# Patient Record
Sex: FEMALE | Race: WHITE | NOT HISPANIC OR LATINO | Employment: UNEMPLOYED | URBAN - METROPOLITAN AREA
[De-identification: names, ages, dates, MRNs, and addresses within clinical notes are randomized per-mention and may not be internally consistent; named-entity substitution may affect disease eponyms.]

---

## 2019-05-18 ENCOUNTER — HOSPITAL ENCOUNTER (EMERGENCY)
Facility: HOSPITAL | Age: 34
Discharge: HOME | End: 2019-05-18
Attending: EMERGENCY MEDICINE | Admitting: EMERGENCY MEDICINE
Payer: COMMERCIAL

## 2019-05-18 VITALS
SYSTOLIC BLOOD PRESSURE: 98 MMHG | WEIGHT: 119 LBS | OXYGEN SATURATION: 98 % | HEART RATE: 78 BPM | TEMPERATURE: 98 F | DIASTOLIC BLOOD PRESSURE: 56 MMHG | RESPIRATION RATE: 16 BRPM

## 2019-05-18 DIAGNOSIS — F41.9 ANXIETY: Primary | ICD-10-CM

## 2019-05-18 PROCEDURE — 99283 EMERGENCY DEPT VISIT LOW MDM: CPT

## 2019-05-18 PROCEDURE — 99283 EMERGENCY DEPT VISIT LOW MDM: CPT | Performed by: EMERGENCY MEDICINE

## 2019-05-18 RX ORDER — LORAZEPAM 0.5 MG/1
0.5 TABLET ORAL ONCE
Status: COMPLETED | OUTPATIENT
Start: 2019-05-18 | End: 2019-05-18

## 2019-05-18 RX ADMIN — LORAZEPAM 0.5 MG: 0.5 TABLET ORAL at 02:04

## 2024-08-19 ENCOUNTER — HOSPITAL ENCOUNTER (INPATIENT)
Facility: HOSPITAL | Age: 39
LOS: 2 days | Discharge: HOME | DRG: 897 | End: 2024-08-21
Attending: EMERGENCY MEDICINE | Admitting: EMERGENCY MEDICINE
Payer: COMMERCIAL

## 2024-08-19 DIAGNOSIS — F41.9 ANXIETY AND DEPRESSION: ICD-10-CM

## 2024-08-19 DIAGNOSIS — F10.10 ALCOHOL ABUSE: Primary | ICD-10-CM

## 2024-08-19 DIAGNOSIS — F13.10 BENZODIAZEPINE ABUSE (HCC): ICD-10-CM

## 2024-08-19 DIAGNOSIS — F32.A ANXIETY AND DEPRESSION: ICD-10-CM

## 2024-08-19 DIAGNOSIS — F10.20 ALCOHOL USE DISORDER, MODERATE, DEPENDENCE (HCC): ICD-10-CM

## 2024-08-19 PROBLEM — F13.20 BENZODIAZEPINE DEPENDENCE (HCC): Status: ACTIVE | Noted: 2024-08-19

## 2024-08-19 PROBLEM — D50.9 IRON DEFICIENCY ANEMIA: Status: ACTIVE | Noted: 2024-08-19

## 2024-08-19 PROBLEM — F10.939 ALCOHOL WITHDRAWAL (HCC): Status: ACTIVE | Noted: 2024-08-19

## 2024-08-19 PROBLEM — F10.90 ALCOHOL USE DISORDER: Status: ACTIVE | Noted: 2024-08-19

## 2024-08-19 LAB
ALBUMIN SERPL BCG-MCNC: 4.3 G/DL (ref 3.5–5)
ALP SERPL-CCNC: 53 U/L (ref 34–104)
ALT SERPL W P-5'-P-CCNC: 19 U/L (ref 7–52)
AMPHETAMINES SERPL QL SCN: NEGATIVE
ANION GAP SERPL CALCULATED.3IONS-SCNC: 10 MMOL/L (ref 4–13)
AST SERPL W P-5'-P-CCNC: 41 U/L (ref 13–39)
BARBITURATES UR QL: NEGATIVE
BASOPHILS # BLD AUTO: 0.06 THOUSANDS/ÂΜL (ref 0–0.1)
BASOPHILS NFR BLD AUTO: 1 % (ref 0–1)
BENZODIAZ UR QL: NEGATIVE
BILIRUB SERPL-MCNC: 0.59 MG/DL (ref 0.2–1)
BUN SERPL-MCNC: 10 MG/DL (ref 5–25)
CALCIUM SERPL-MCNC: 8.7 MG/DL (ref 8.4–10.2)
CHLORIDE SERPL-SCNC: 104 MMOL/L (ref 96–108)
CO2 SERPL-SCNC: 22 MMOL/L (ref 21–32)
COCAINE UR QL: NEGATIVE
CREAT SERPL-MCNC: 0.7 MG/DL (ref 0.6–1.3)
EOSINOPHIL # BLD AUTO: 0.14 THOUSAND/ÂΜL (ref 0–0.61)
EOSINOPHIL NFR BLD AUTO: 3 % (ref 0–6)
ERYTHROCYTE [DISTWIDTH] IN BLOOD BY AUTOMATED COUNT: 13.8 % (ref 11.6–15.1)
ETHANOL SERPL-MCNC: 161 MG/DL
EXT PREGNANCY TEST URINE: NEGATIVE
EXT. CONTROL: NORMAL
FENTANYL UR QL SCN: NEGATIVE
GFR SERPL CREATININE-BSD FRML MDRD: 108 ML/MIN/1.73SQ M
GLUCOSE SERPL-MCNC: 119 MG/DL (ref 65–140)
HCT VFR BLD AUTO: 34.2 % (ref 34.8–46.1)
HGB BLD-MCNC: 11.7 G/DL (ref 11.5–15.4)
HYDROCODONE UR QL SCN: NEGATIVE
IMM GRANULOCYTES # BLD AUTO: 0.01 THOUSAND/UL (ref 0–0.2)
IMM GRANULOCYTES NFR BLD AUTO: 0 % (ref 0–2)
LIPASE SERPL-CCNC: 37 U/L (ref 11–82)
LYMPHOCYTES # BLD AUTO: 1.57 THOUSANDS/ÂΜL (ref 0.6–4.47)
LYMPHOCYTES NFR BLD AUTO: 37 % (ref 14–44)
MAGNESIUM SERPL-MCNC: 1.9 MG/DL (ref 1.9–2.7)
MCH RBC QN AUTO: 32.8 PG (ref 26.8–34.3)
MCHC RBC AUTO-ENTMCNC: 34.2 G/DL (ref 31.4–37.4)
MCV RBC AUTO: 96 FL (ref 82–98)
METHADONE UR QL: NEGATIVE
MONOCYTES # BLD AUTO: 0.47 THOUSAND/ÂΜL (ref 0.17–1.22)
MONOCYTES NFR BLD AUTO: 11 % (ref 4–12)
NEUTROPHILS # BLD AUTO: 1.96 THOUSANDS/ÂΜL (ref 1.85–7.62)
NEUTS SEG NFR BLD AUTO: 48 % (ref 43–75)
NRBC BLD AUTO-RTO: 0 /100 WBCS
OPIATES UR QL SCN: NEGATIVE
OXYCODONE+OXYMORPHONE UR QL SCN: NEGATIVE
PCP UR QL: NEGATIVE
PLATELET # BLD AUTO: 211 THOUSANDS/UL (ref 149–390)
PMV BLD AUTO: 8 FL (ref 8.9–12.7)
POTASSIUM SERPL-SCNC: 3.7 MMOL/L (ref 3.5–5.3)
PROT SERPL-MCNC: 7.2 G/DL (ref 6.4–8.4)
RBC # BLD AUTO: 3.57 MILLION/UL (ref 3.81–5.12)
SODIUM SERPL-SCNC: 136 MMOL/L (ref 135–147)
THC UR QL: NEGATIVE
WBC # BLD AUTO: 4.21 THOUSAND/UL (ref 4.31–10.16)

## 2024-08-19 PROCEDURE — 99285 EMERGENCY DEPT VISIT HI MDM: CPT | Performed by: EMERGENCY MEDICINE

## 2024-08-19 PROCEDURE — 85025 COMPLETE CBC W/AUTO DIFF WBC: CPT | Performed by: EMERGENCY MEDICINE

## 2024-08-19 PROCEDURE — 82077 ASSAY SPEC XCP UR&BREATH IA: CPT | Performed by: EMERGENCY MEDICINE

## 2024-08-19 PROCEDURE — 83690 ASSAY OF LIPASE: CPT | Performed by: EMERGENCY MEDICINE

## 2024-08-19 PROCEDURE — 93005 ELECTROCARDIOGRAM TRACING: CPT

## 2024-08-19 PROCEDURE — 80053 COMPREHEN METABOLIC PANEL: CPT | Performed by: EMERGENCY MEDICINE

## 2024-08-19 PROCEDURE — 99284 EMERGENCY DEPT VISIT MOD MDM: CPT

## 2024-08-19 PROCEDURE — 80307 DRUG TEST PRSMV CHEM ANLYZR: CPT | Performed by: EMERGENCY MEDICINE

## 2024-08-19 PROCEDURE — 83735 ASSAY OF MAGNESIUM: CPT | Performed by: EMERGENCY MEDICINE

## 2024-08-19 PROCEDURE — 81025 URINE PREGNANCY TEST: CPT | Performed by: EMERGENCY MEDICINE

## 2024-08-19 PROCEDURE — 96360 HYDRATION IV INFUSION INIT: CPT

## 2024-08-19 PROCEDURE — 36415 COLL VENOUS BLD VENIPUNCTURE: CPT | Performed by: EMERGENCY MEDICINE

## 2024-08-19 RX ORDER — GABAPENTIN 300 MG/1
300 CAPSULE ORAL ONCE
Status: COMPLETED | OUTPATIENT
Start: 2024-08-19 | End: 2024-08-19

## 2024-08-19 RX ORDER — LANOLIN ALCOHOL/MO/W.PET/CERES
100 CREAM (GRAM) TOPICAL ONCE
Status: COMPLETED | OUTPATIENT
Start: 2024-08-19 | End: 2024-08-19

## 2024-08-19 RX ORDER — PRAZOSIN HYDROCHLORIDE 1 MG/1
1 CAPSULE ORAL
Status: ON HOLD | COMMUNITY
End: 2024-08-21

## 2024-08-19 RX ORDER — ESCITALOPRAM OXALATE 10 MG/1
20 TABLET ORAL DAILY
Status: DISCONTINUED | OUTPATIENT
Start: 2024-08-20 | End: 2024-08-21 | Stop reason: HOSPADM

## 2024-08-19 RX ORDER — ACETAMINOPHEN 325 MG/1
650 TABLET ORAL EVERY 6 HOURS PRN
Status: DISCONTINUED | OUTPATIENT
Start: 2024-08-19 | End: 2024-08-21 | Stop reason: HOSPADM

## 2024-08-19 RX ORDER — GABAPENTIN 300 MG/1
300 CAPSULE ORAL
Status: DISCONTINUED | OUTPATIENT
Start: 2024-08-20 | End: 2024-08-20

## 2024-08-19 RX ORDER — ESCITALOPRAM OXALATE 20 MG/1
20 TABLET ORAL DAILY
Status: ON HOLD | COMMUNITY
End: 2024-08-21

## 2024-08-19 RX ORDER — ENOXAPARIN SODIUM 100 MG/ML
40 INJECTION SUBCUTANEOUS DAILY
Status: DISCONTINUED | OUTPATIENT
Start: 2024-08-20 | End: 2024-08-21 | Stop reason: HOSPADM

## 2024-08-19 RX ORDER — PHENOBARBITAL SODIUM 130 MG/ML
260 INJECTION, SOLUTION INTRAMUSCULAR; INTRAVENOUS ONCE
Status: COMPLETED | OUTPATIENT
Start: 2024-08-19 | End: 2024-08-19

## 2024-08-19 RX ORDER — SODIUM CHLORIDE 9 MG/ML
100 INJECTION, SOLUTION INTRAVENOUS CONTINUOUS
Status: DISCONTINUED | OUTPATIENT
Start: 2024-08-19 | End: 2024-08-20

## 2024-08-19 RX ORDER — DIAZEPAM 10 MG/2ML
10 INJECTION, SOLUTION INTRAMUSCULAR; INTRAVENOUS ONCE
Status: COMPLETED | OUTPATIENT
Start: 2024-08-19 | End: 2024-08-19

## 2024-08-19 RX ORDER — GABAPENTIN 300 MG/1
300 CAPSULE ORAL
Status: ON HOLD | COMMUNITY
End: 2024-08-21

## 2024-08-19 RX ORDER — CLONAZEPAM 1 MG/1
2 TABLET ORAL 2 TIMES DAILY PRN
COMMUNITY
End: 2024-08-21

## 2024-08-19 RX ORDER — PHENOBARBITAL SODIUM 130 MG/ML
130 INJECTION, SOLUTION INTRAMUSCULAR; INTRAVENOUS ONCE
Status: COMPLETED | OUTPATIENT
Start: 2024-08-19 | End: 2024-08-19

## 2024-08-19 RX ORDER — ONDANSETRON 2 MG/ML
4 INJECTION INTRAMUSCULAR; INTRAVENOUS EVERY 6 HOURS PRN
Status: DISCONTINUED | OUTPATIENT
Start: 2024-08-19 | End: 2024-08-21 | Stop reason: HOSPADM

## 2024-08-19 RX ADMIN — SODIUM CHLORIDE 100 ML/HR: 0.9 INJECTION, SOLUTION INTRAVENOUS at 20:20

## 2024-08-19 RX ADMIN — DIAZEPAM 10 MG: 5 INJECTION INTRAMUSCULAR; INTRAVENOUS at 20:07

## 2024-08-19 RX ADMIN — THIAMINE HCL TAB 100 MG 100 MG: 100 TAB at 17:30

## 2024-08-19 RX ADMIN — PHENOBARBITAL SODIUM 260 MG: 130 INJECTION INTRAMUSCULAR; INTRAVENOUS at 20:08

## 2024-08-19 RX ADMIN — PHENOBARBITAL SODIUM 130 MG: 130 INJECTION INTRAMUSCULAR; INTRAVENOUS at 23:51

## 2024-08-19 RX ADMIN — SODIUM CHLORIDE 1000 ML: 0.9 INJECTION, SOLUTION INTRAVENOUS at 17:09

## 2024-08-19 RX ADMIN — GABAPENTIN 300 MG: 300 CAPSULE ORAL at 23:52

## 2024-08-19 NOTE — ED PROVIDER NOTES
History  Chief Complaint   Patient presents with    Detox Evaluation     Requesting detox from Alcohol and benozs - reports half bottle of vodka /day (last drink 20 min pta) - denies hx of seizures - Klonopin 1mg twice a day - reports nausea and stomach pain at this time      Patient with history of alcohol abuse and benzo abuse just had detox in the facility [place] with a Valium taper however she left the facility because of panic attack she has been drinking a half a bottle of alcohol a day (375ml)  Patient also uses Klonopin 1 mg twice a day sometimes occasionally x-ray she last drank before she got here she denies any withdrawal seizures no chest pain or shortness of breath no vomiting diarrhea no abdominal pain she does have a medical marijuana card and uses as well history of PTSD patient is requesting detox      Detox Evaluation  Associated symptoms: no abdominal pain, no confusion, no hallucinations, no palpitations, no seizures, no shortness of breath, no suicidal ideation and no vomiting        None       Past Medical History:   Diagnosis Date    Anxiety     Migraine        Past Surgical History:   Procedure Laterality Date     SECTION         History reviewed. No pertinent family history.  I have reviewed and agree with the history as documented.    E-Cigarette/Vaping     E-Cigarette/Vaping Substances     Social History     Tobacco Use    Smoking status: Never    Smokeless tobacco: Never   Substance Use Topics    Alcohol use: Yes     Comment: 1/2 bottle vodka/day    Drug use: Yes     Types: Marijuana, Benzodiazepines       Review of Systems   Constitutional:  Negative for chills and fever.   Eyes:  Negative for visual disturbance.   Respiratory:  Negative for shortness of breath.    Cardiovascular:  Negative for chest pain and palpitations.   Gastrointestinal:  Negative for abdominal pain, diarrhea and vomiting.   Genitourinary:  Negative for dysuria.   Musculoskeletal:  Negative for  arthralgias and back pain.   Skin:  Negative for color change and rash.   Neurological:  Negative for seizures and syncope.   Psychiatric/Behavioral:  Negative for confusion, hallucinations and suicidal ideas.    All other systems reviewed and are negative.      Physical Exam  Physical Exam  Vitals and nursing note reviewed.   Constitutional:       General: She is not in acute distress.     Appearance: She is well-developed.   HENT:      Head: Normocephalic and atraumatic.   Eyes:      Conjunctiva/sclera: Conjunctivae normal.   Cardiovascular:      Rate and Rhythm: Normal rate and regular rhythm.      Heart sounds: No murmur heard.  Pulmonary:      Effort: Pulmonary effort is normal. No respiratory distress.      Breath sounds: Normal breath sounds.   Abdominal:      Palpations: Abdomen is soft.      Tenderness: There is no abdominal tenderness.   Musculoskeletal:         General: No swelling.      Cervical back: Neck supple.   Skin:     General: Skin is warm and dry.      Capillary Refill: Capillary refill takes less than 2 seconds.      Coloration: Skin is not jaundiced.   Neurological:      General: No focal deficit present.      Mental Status: She is alert.   Psychiatric:         Mood and Affect: Mood normal.         Thought Content: Thought content normal.         Vital Signs  ED Triage Vitals   Temperature Pulse Respirations Blood Pressure SpO2   08/19/24 1620 08/19/24 1618 08/19/24 1618 08/19/24 1618 08/19/24 1618   97.7 °F (36.5 °C) 104 18 146/99 99 %      Temp Source Heart Rate Source Patient Position - Orthostatic VS BP Location FiO2 (%)   08/19/24 1620 08/19/24 1618 08/19/24 1618 08/19/24 1618 --   Oral Monitor Sitting Left arm       Pain Score       --                  Vitals:    08/19/24 1618   BP: 146/99   Pulse: 104   Patient Position - Orthostatic VS: Sitting         Visual Acuity      ED Medications  Medications   sodium chloride 0.9 % bolus 1,000 mL (1,000 mL Intravenous New Bag 8/19/24 1702)    thiamine tablet 100 mg (100 mg Oral Given 8/19/24 1730)       Diagnostic Studies  Results Reviewed       Procedure Component Value Units Date/Time    POCT pregnancy, urine [[id number]]  (Normal) Resulted: 08/19/24 1741    Lab Status: Final result Updated: 08/19/24 1741     Control Valid     EXT Preg Test, Ur Negative    Rapid drug screen, urine [[id number]] Collected: 08/19/24 1730    Lab Status: In process Specimen: Urine, Clean Catch Updated: 08/19/24 1739    Comprehensive metabolic panel [[id number]]  (Abnormal) Collected: 08/19/24 1704    Lab Status: Final result Specimen: Blood from Arm, Right Updated: 08/19/24 1727     Sodium 136 mmol/L      Potassium 3.7 mmol/L      Chloride 104 mmol/L      CO2 22 mmol/L      ANION GAP 10 mmol/L      BUN 10 mg/dL      Creatinine 0.70 mg/dL      Glucose 119 mg/dL      Calcium 8.7 mg/dL      AST 41 U/L      ALT 19 U/L      Alkaline Phosphatase 53 U/L      Total Protein 7.2 g/dL      Albumin 4.3 g/dL      Total Bilirubin 0.59 mg/dL      eGFR 108 ml/min/1.73sq m     Narrative:      [employer] guidelines for Chronic Kidney Disease (CKD):     Stage 1 with normal or high GFR (GFR > 90 mL/min/1.73 square meters)    Stage 2 Mild CKD (GFR = 60-89 mL/min/1.73 square meters)    Stage 3A Moderate CKD (GFR = 45-59 mL/min/1.73 square meters)    Stage 3B Moderate CKD (GFR = 30-44 mL/min/1.73 square meters)    Stage 4 Severe CKD (GFR = 15-29 mL/min/1.73 square meters)    Stage 5 End Stage CKD (GFR <15 mL/min/1.73 square meters)  Note: GFR calculation is accurate only with a steady state creatinine    Lipase [[id number]]  (Normal) Collected: 08/19/24 1704    Lab Status: Final result Specimen: Blood from Arm, Right Updated: 08/19/24 1727     Lipase 37 u/L     Magnesium [[id number]]  (Normal) Collected: 08/19/24 1704    Lab Status: Final result Specimen: Blood from Arm, Right Updated: 08/19/24 1727     Magnesium 1.9 mg/dL     Ethanol [[id number]]  (Abnormal) Collected:  08/19/24 1704    Lab Status: Final result Specimen: Blood from Arm, Right Updated: 08/19/24 1727     Ethanol Lvl 161 mg/dL     CBC and differential [[id number]]  (Abnormal) Collected: 08/19/24 1704    Lab Status: Final result Specimen: Blood from Arm, Right Updated: 08/19/24 1712     WBC 4.21 Thousand/uL      RBC 3.57 Million/uL      Hemoglobin 11.7 g/dL      Hematocrit 34.2 %      MCV 96 fL      MCH 32.8 pg      MCHC 34.2 g/dL      RDW 13.8 %      MPV 8.0 fL      Platelets 211 Thousands/uL      nRBC 0 /100 WBCs      Segmented % 48 %      Immature Grans % 0 %      Lymphocytes % 37 %      Monocytes % 11 %      Eosinophils Relative 3 %      Basophils Relative 1 %      Absolute Neutrophils 1.96 Thousands/µL      Absolute Immature Grans 0.01 Thousand/uL      Absolute Lymphocytes 1.57 Thousands/µL      Absolute Monocytes 0.47 Thousand/µL      Eosinophils Absolute 0.14 Thousand/µL      Basophils Absolute 0.06 Thousands/µL                    No orders to display              Procedures  ECG 12 Lead Documentation Only    Date/Time: 8/19/2024 4:44 PM    Performed by: Narendra Montez MD  Authorized by: Narendra Montez MD    Indications / Diagnosis:  Alcohol abuse  ECG reviewed by me, the ED Provider: yes    Interpretation:     Interpretation: normal    Rate:     ECG rate:  101  Rhythm:     Rhythm: sinus tachycardia    QRS:     QRS intervals:  Normal  ST segments:     ST segments:  Non-specific  Comments:      Qt nml           ED Course                                               Medical Decision Making  Patient with history of alcohol abuse and benzo abuse been drinking half 1/5 of alcohol liquor for a month straight requesting detox patient workup is thus far essentially unremarkable EKG showed normal sinus rhythm normal QRS and QT lab work showed no gap acidosis no bump in her LFTs alcohol level is 161 here no signs of withdrawal  Patient was excepted by the detox service    Amount and/or Complexity of Data Reviewed  Labs:  ordered.    Risk  OTC drugs.  Decision regarding hospitalization.                 Disposition  Final diagnoses:   Alcohol abuse   Benzodiazepine abuse (HCC)     Time reflects when diagnosis was documented in both MDM as applicable and the Disposition within this note       Time User Action Codes Description Comment    8/19/2024  5:46 PM Narendra Montez Add [F10.10] Alcohol abuse     8/19/2024  5:46 PM Narendra Montez [F13.10] Benzodiazepine abuse (HCC)           ED Disposition       ED Disposition   Admit    Condition   Stable    Date/Time   Mon Aug 19, 2024  5:46 PM    Comment   Case was discussed with detox and the patient's admission status was agreed to be Admission Status: inpatient status to the service of Dr. Disla .               Follow-up Information    None         Patient's Medications    No medications on file       No discharge procedures on file.    PDMP Review       None            ED Provider  Electronically Signed by             Narendra Montez MD  08/19/24 3709

## 2024-08-19 NOTE — H&P
HISTORY & PHYSICAL EXAM  DEPARTMENT OF MEDICAL TOXICOLOGY  LEVEL 4 MEDICAL DETOX UNIT  Mckenna Brown 39 y.o. female MRN: [id number]  Unit/Bed#: [address] [place] [address] [id number] Encounter: [id number]      Reason for Admission/Principal Problem: Ethanol withdrawal, Ethanol use disorder  Admitting Provider: Emmie Hobson PA-C  Attending Provider: Noemy att. providers found   8/19/2024  4:21 PM        Iron deficiency anemia  Assessment & Plan  History of CHENG   Obtain iron panel  Continue to monitor and initiate ferrous sulfate if needed     Anxiety and depression  Assessment & Plan  Patient endorses a h/o chronic anxiety and depression  Home medications include: Lexapro 20 mg, Klonpin 1mg BID   Denies SI/HI/thoughts of self harm  Continue home medications  Recommend OP f/u with PCP/OP Psychiatry     Benzodiazepine dependence (HCC)  Assessment & Plan  Patient with a h/o chronic benzodiazepine use  Using Klonopin  2mg daily   Denies H/o benzo withdrawal seizures  Withdrawal management under SEWS protocol as above as patient is no longer interested in continuing   Recommend f/u with OP Psychiatry for continued psychiatric care  Consult case management/CRS for assistance with aftercare resources - pt interested in inpatient drug and alcohol after discharge  Encourage cessation of benzodiazepine use     Alcohol use disorder  Assessment & Plan  Pt with a h/o chronic heavy alcohol use   Drinks 1/5th of liquor daily  Previous admission to the [facility] Medical Detox Unit   Denies H/o withdrawal seizures  Contemplating naltrexone at this time  Withdrawal management as above  Initiate IVFs, IV thiamine, folic acid, and MVI  Consult case management/CRS for assistance with aftercare resources - pt interested in inpatient drug and alcohol rehab at this time     * Alcohol withdrawal (HCC)  Assessment & Plan  H/o chronic daily alcohol consumption, denies h/o withdrawal seizures  Last drink: 8/19/24   Ethanol lvl: 161 8/19/24 146/99   Follow  SEWS protocol with symptom-triggered phenobarbital for medically-assisted alcohol withdrawal  Current symptoms include anxiety, tachycardia, diaphoresis   SEWS score upon admission 6  Dose phenobarbital as indicated   Will initiate thiamine, folic acid, and multivitamin supplementation   Continue to monitor vitals, symptoms                 VTE Prophylaxis: Enoxaparin (Lovenox)  / sequential compression device   Code Status: Full Code       Anticipated Length of Stay:  Patient will be admitted on an Inpatient basis with an anticipated length of stay of  2  midnights.   Justification for Hospital Stay: alcohol withdrawal     For any questions or concerns, please Tiger Text the advanced practitioner in the role of [facility]-DETOX-AP On Call      This patient qualifies for Level IV medically managed intensive inpatient services under the criteria set by the American Society of Addiction Medicine, including dimensions 1-3. The patient is in withdrawal (or is intoxicated with high risk of withdrawal), with severe and unstable medical and/or psychiatric (dual diagnosis) problems, requiring requires 24-hour medical and nursing care and the full resources of a licensed hospital.          SEWS PHENOBARBITAL PROTOCOL FOR ALCOHOL WITHDRAWAL    Admit patient to medical detox unit and continue supportive care and stabilization of acute ethanol withdrawal per medical toxicology/detox treatment pathway. Monitor ethanol withdrawal severity via the Severity of Ethanol Withdrawal Scale (SEWS) Q4 hours and then hourly if/when SEWS > 6. Treat withdrawal per pathway and reassess Q30-60 minutes.           Mild SEWS Score 1-6  Administer medications* (IV or PO; PO preferred):  If initial SEWS score: diazepam 10mg PO/IV x 1 AND phenobarbital 65 mg PO/IV x 1  If repeat SEWS score 1-6: phenobarbital 65 mg PO/IV q1 hour x 5 doses maximum   Reassessment:   SEWS q1 hour after each dose until SEWS 0 x 2 hours  VS q1 hours (until SEWS 0, then q4  hours)  Notify provider for bedside evaluation if 5-dose maximum is reached, RASS of -3 to -5, or SEWS score escalates to moderate or severe.   Moderate SEWS Score 7-12  Administer medications* (IV):  If initial SEWS score: diazepam 10mg IV x 1 AND phenobarbital 260 mg IV x 1  If repeat SEWS score 7-12 or score escalated from mild: phenobarbital 130 mg IV q30 minutes x 5 doses maximum   Reassessment:  SEWS q30 minutes after each dose until SEWS < 7 (then hourly until SEWS 0 x 2 hours)  VS q30 minutes until SEWS < 7 (then hourly until SEWS 0, then q4 hours)  Notify provider for bedside evaluation if 5-dose maximum is reached, RASS of -3 to -5, or SEWS score escalates to severe.   Severe SEWS Score ? 13  Administer medications* (IV):  If initial SEWS score: Diazepam 10 mg IV x 1 AND phenobarbital 650 mg IV piggyback x 1 over 15-30 minutes  If repeat SEWS score ? 13 or score escalated from mild or moderate: phenobarbital 130 mg IV q30 minutes x 5 doses maximum   Reassessment:  SEWS q30 minutes after each dose until SEWS < 7 (then hourly until SEWS 0 x 2 hours)   VS q30 minutes until SEWS < 7 (then hourly until SEWS 0, then q4 hours)  Notify provider for bedside evaluation if 5-dose maximum is reached or RASS of -3 to -5   *Hold medications and notify provider if CNS depression, respirations < 10/min, or RASS of -3 to -5.         Medications to be administered adjunctively if more than 2 grams of phenobarbital is needed for stabilization of withdrawal; require attending approval.   Dexmedetomidine infusion 0.1-1mcg/kg/hr IV infusion, titratable to reduced agitation (Goal: RASS -2)  Ketamine   Acute agitated delirium: 1-2 mg/kg IV or 4-5 mg/kg IM  Refractory withdrawal: 0.1-1mg/kg/hr IV infusion, titratable to reduced agitation (Goal: RASS -2)    Further evaluation, screening and treatment:  Evaluate complete metabolic panel, transaminases, INR, and lipase. Assess hepatic ultrasound for any sign of alcoholic liver  disease or cirrhosis, and ultimately refer for further hepatic evaluation and care as/if indicated.    Additional medications for ethanol associated malnutrition:  Thiamine 100 mg IV daily, increase to 500 mg TID for signs/symptoms of Wernicke's Encephalopathy or Wernicke Korsakoff Syndrome   Folic acid 1 mg IV daily   Multivitamin PO daily      Will offer first monthly injection of Naltrexone 380 mg IM, once patient is stabilized, as it has been shown to assist in decreasing cravings for ethanol.     Evaluate and treat for coexisting substance use, such as opioids and nicotine. Discuss risk factors for infectious disease, such as history of intravenous drug abuse, and offer hepatitis and HIV screening if indicated.     Case management consultation to assist with coordination of subsequent treatment after discharge.          Hx and PE limited by: none     HPI: Mckenna Brown is a 39 y.o. year old female who presents with alcohol withdrawal seeking detoxification. Patient initially presented to the [facility] ED requesting alcohol and benzodiazepine detoxification. Patient reports a month ago she was put on a valium taper for her withdrawal symptoms and was referred to inpatient drug and alcohol rehab. She reports that she had a panic attack and subsequently has relapsed and has been drinking daily.  Per PDMP patient takes prescribed klonopin with her last script filled on 7/30/24. At this time patient wishes to undergo both benzodiazepine and alcohol detoxification. Serum alcohol was 161 in the ED. . Patient denies history of withdrawal seizures. At this time patient is interested in inpatient drug and alcohol reahb    Preferred alcoholic beverage(s): ?   Quantity and frequency of alcohol intake: 1/5 th daily   Use of any ethanol substitutes (toxic alcohols): no  Date/Time of last alcohol intake: 8/19/24   Current signs and symptoms of ethanol withdrawal: anxiety and diaphoresis    SEWS Total Score: 8 (8/19/2024 11:33  PM)        Ethanol Withdrawal History  Previous ethanol withdrawal? yes  Prior inpatient treatment for ethanol withdrawal? yes  Prior outpatient treatment for ethanol withdrawal? no  History of seizures with prior ethanol withdrawal? no  Prior treatment with naltrexone (Vivitrol)? no  Current treatment with naltrexone (Vivitrol)? no  Other current treatment for ethanol use disorder? no  Co-existing substance use? yes    Review of PDMP: yes     Social History     Substance and Sexual Activity   Alcohol Use Yes    Comment: 1/2 bottle vodka/day     Social History     Substance and Sexual Activity   Drug Use Yes    Types: Marijuana, Benzodiazepines     Social History     Tobacco Use   Smoking Status Never   Smokeless Tobacco Never       Review of Systems   Constitutional:  Negative for chills and fever.   HENT:  Negative for ear pain and sore throat.    Eyes:  Negative for pain and visual disturbance.   Respiratory:  Negative for cough and shortness of breath.    Cardiovascular:  Negative for chest pain and palpitations.   Gastrointestinal:  Negative for abdominal pain and vomiting.   Genitourinary:  Negative for dysuria and hematuria.   Musculoskeletal:  Negative for arthralgias and back pain.   Skin:  Negative for color change and rash.   Neurological:  Positive for tremors. Negative for seizures and syncope.   Psychiatric/Behavioral:  The patient is nervous/anxious.    All other systems reviewed and are negative.      Historical Information   Past Medical History:   Diagnosis Date    Anxiety     Migraine      Past Surgical History:   Procedure Laterality Date     SECTION       History reviewed. No pertinent family history.  Social History   Marital Status: Single   Occupation:   Patient Pre-hospital Living Situation: Alone  Patient Pre-hospital Level of Mobility: Independent mobility  Patient Pre-hospital Diet Restrictions: None    No Known Allergies    Prior to Admission medications    Not  on File       Current Facility-Administered Medications   Medication Dose Route Frequency    acetaminophen (TYLENOL) tablet 650 mg  650 mg Oral Q6H PRN    acetaminophen (TYLENOL) tablet 650 mg  650 mg Oral Q6H PRN    [START ON 8/20/2024] enoxaparin (LOVENOX) subcutaneous injection 40 mg  40 mg Subcutaneous Daily    [START ON 8/20/2024] escitalopram (LEXAPRO) tablet 20 mg  20 mg Oral Daily    [START ON 8/20/2024] folic acid 1 mg, thiamine (VITAMIN B1) 100 mg in sodium chloride 0.9 % 100 mL IV piggyback   Intravenous Daily    [START ON 8/20/2024] gabapentin (NEURONTIN) capsule 300 mg  300 mg Oral HS    gabapentin (NEURONTIN) capsule 300 mg  300 mg Oral Once    [START ON 8/20/2024] multivitamin-minerals (CENTRUM) tablet 1 tablet  1 tablet Oral Daily    ondansetron (ZOFRAN) injection 4 mg  4 mg Intravenous Q6H PRN    PHENobarbital injection 130 mg  130 mg Intravenous Once    sodium chloride 0.9 % infusion  100 mL/hr Intravenous Continuous       Continuous Infusions:sodium chloride, 100 mL/hr, Last Rate: 100 mL/hr (08/19/24 2020)             Objective       Intake/Output Summary (Last 24 hours) at 8/19/2024 2345  Last data filed at 8/19/2024 1809  Gross per 24 hour   Intake 1000 ml   Output --   Net 1000 ml       Invasive Devices:   Peripheral IV 08/19/24 Dorsal (posterior);Right Forearm (Active)   Site Assessment WDL;Clean;Dry;Intact 08/19/24 1705   Dressing Type Transparent 08/19/24 1705   Line Status Blood return noted;Flushed & Clamped 08/19/24 1705   Dressing Status Clean;Dry;Intact 08/19/24 1705       Vitals   Vitals:    08/19/24 1916 08/19/24 2000 08/19/24 2118 08/19/24 2331   BP: 119/78  112/69 148/87   TempSrc: Temporal  Temporal Temporal   Pulse: 88  92 104   Resp: 16  16 16   Patient Position - Orthostatic VS: Lying  Lying Lying   Temp: 97.7 °F (36.5 °C) 98.4 °F (36.9 °C) 98.4 °F (36.9 °C) 97.9 °F (36.6 °C)       Physical Exam  Vitals and nursing note reviewed.   Constitutional:       Appearance: She is not  ill-appearing or diaphoretic.   HENT:      Head: Normocephalic and atraumatic.      Nose: No congestion or rhinorrhea.   Eyes:      General: No scleral icterus.     Extraocular Movements: Extraocular movements intact.      Conjunctiva/sclera: Conjunctivae normal.      Pupils: Pupils are equal, round, and reactive to light.   Cardiovascular:      Rate and Rhythm: Normal rate and regular rhythm.   Pulmonary:      Effort: Pulmonary effort is normal. No respiratory distress.      Breath sounds: Normal breath sounds. No stridor. No wheezing.   Abdominal:      General: Bowel sounds are normal. There is no distension.      Palpations: Abdomen is soft.      Tenderness: There is no abdominal tenderness.   Musculoskeletal:         General: No swelling or tenderness.      Cervical back: Normal range of motion and neck supple.      Right lower leg: No edema.      Left lower leg: No edema.   Skin:     Capillary Refill: Capillary refill takes less than 2 seconds.      Findings: No bruising, lesion or rash.   Neurological:      Mental Status: She is oriented to person, place, and time.      GCS: GCS eye subscore is 4. GCS verbal subscore is 5. GCS motor subscore is 6.      Cranial Nerves: Cranial nerves 2-12 are intact.      Sensory: Sensation is intact.      Motor: Tremor present.      Coordination: Coordination is intact.      Gait: Gait is intact.   Psychiatric:         Attention and Perception: She does not perceive auditory or visual hallucinations.         Mood and Affect: Mood is anxious.         Thought Content: Thought content does not include homicidal or suicidal ideation. Thought content does not include homicidal or suicidal plan.         Data:    EKG, Pathology, and Other Studies: I have personally reviewed pertinent reports.    EKG: Reviewed    Lab Results:  CBC ETOH     Lab Results   Component Value Date    WBC 4.21 (L) 08/19/2024    RBC 3.57 (L) 08/19/2024    HGB 11.7 08/19/2024    HCT 34.2 (L) 08/19/2024    MCV  "96 08/19/2024    MCH 32.8 08/19/2024    MCHC 34.2 08/19/2024    RDW 13.8 08/19/2024     08/19/2024    MPV 8.0 (L) 08/19/2024      No results found for: \"LACTICACID\"   CMP UA         Component Value Date/Time    K 3.7 08/19/2024 1704    K 4.0 11/01/2023 1026     08/19/2024 1704     11/01/2023 1026    CO2 22 08/19/2024 1704    CO2 28 11/01/2023 1026    BUN 10 08/19/2024 1704    BUN 11 11/01/2023 1026    CREATININE 0.70 08/19/2024 1704    CREATININE 0.72 11/01/2023 1026         Component Value Date/Time    CALCIUM 8.7 08/19/2024 1704    CALCIUM 9.7 11/01/2023 1026    ALKPHOS 53 08/19/2024 1704    ALKPHOS 32 (L) 11/01/2023 1026    AST 41 (H) 08/19/2024 1704    AST 22 11/01/2023 1026    ALT 19 08/19/2024 1704    ALT 21 11/01/2023 1026      No results found for: \"CLARITYU\", \"COLORU\", \"SPECGRAV\", \"PHUR\", \"GLUCOSEU\", \"KETONESU\", \"BLOODU\", \"PROTEIN UA\", \"NITRITE\", \"BILIRUBINUR\", \"UROBILINOGEN\", \"LEUKOCYTESUR\", \"WBCUA\", \"RBCUA\", \"HYALINE\", \"BACTERIA\", \"EPIS\"     Liver Function Test: ASA     Lab Results   Component Value Date    TBILI 0.59 08/19/2024    TBILI 0.4 11/01/2023    ALKPHOS 53 08/19/2024    ALKPHOS 32 (L) 11/01/2023    AST 41 (H) 08/19/2024    AST 22 11/01/2023    ALT 19 08/19/2024    ALT 21 11/01/2023    TP 7.2 08/19/2024    TP 7.4 11/01/2023    ALB 4.3 08/19/2024    ALB 4.5 11/01/2023      No results found for: \"SALICYLATE\"   Troponin APAP     No results found for: \"TROPONINI\"   No results found for: \"ACTMNPHEN\"   VBG HCG     No results found for: \"PHVEN\", \"PSO7YKE\", \"PO2VEN\", \"BZE3HXB\", \"BEVEN\", \"K6NMZXQXX\", \"J5IFJPL\"   No results found for: \"HCGQUANT\"   ABG Urine Drug Screen     No results found for: \"PHART\", \"YTM8OCX\", \"PO2ART\", \"CET4GNQ\", \"BEART\", \"Z7QVNUQPE\", \"O2HGB\", \"SOURC\", \"NIGEL\", \"VTAC\", \"ACRATE\", \"INSPIREDAIR\", \"PEEP\"   Lab Results   Component Value Date    AMPMETHUR Negative 08/19/2024    BARBTUR Negative 08/19/2024    BDZUR Negative 08/19/2024    COCAINEUR Negative 08/19/2024    " "METHADONEUR Negative 08/19/2024    OPIATEUR Negative 08/19/2024    PCPUR Negative 08/19/2024    THCUR Negative 08/19/2024    OXYCODONEUR Negative 08/19/2024      Lactate INR     No results found for: \"LACTICACID\"   No results found for: \"INR\"   PTT Protime     No results found for: \"PTT\"     No results found for: \"PROTIME\"           Imaging Studies: I have personally reviewed pertinent reports.        Counseling / Coordination of Care  Total floor / unit time spent today 45 minutes. Greater than 50% of total time was spent with the patient and / or family counseling and / or coordination of care.       Minutes of critical care time 45  -Critical care time was exclusive of separately billable procedures and teaching time.   -Critical care was necessary to treat or prevent imminent or life-threatening deterioration of the following condition: CNS failure/compromise, toxidrome (ethanol withdrawal),  withdrawal  -Critical care time was spent personally by me on the following activities as well as the above as per the course and rest of chart: obtaining history from patient/surrogate, development of a treatment plan, discussions with referring provider(s), evaluation of patient's response to the treatment, examination of the patient, performing treatments and interventions, re-evaluation of the patient's condition, review of old charts, ordering/interpreting laboratory studies, ordering/interpreting of radiographic studies.      ** Please Note: This note has been constructed using a voice recognition system. **   "

## 2024-08-19 NOTE — ASSESSMENT & PLAN NOTE
Pt with a h/o chronic heavy alcohol use   Drinks 1/5th of liquor daily  Previous admission to the [facility] Medical Detox Unit   Denies H/o withdrawal seizures  Contemplating naltrexone at this time  Withdrawal management as above  Initiate IVFs, IV thiamine, folic acid, and MVI  Consult case management/CRS for assistance with aftercare resources - pt interested in inpatient drug and alcohol rehab at this time

## 2024-08-19 NOTE — ASSESSMENT & PLAN NOTE
H/o chronic daily alcohol consumption, denies h/o withdrawal seizures  Last drink: 8/19/24   Ethanol lvl: 161 8/19/24 146/99   Follow SEWS protocol with symptom-triggered phenobarbital for medically-assisted alcohol withdrawal  Current symptoms include anxiety, tachycardia, diaphoresis   SEWS score upon admission 6  Dose phenobarbital as indicated   Will initiate thiamine, folic acid, and multivitamin supplementation   Continue to monitor vitals, symptoms

## 2024-08-20 PROBLEM — E83.42 HYPOMAGNESEMIA: Status: ACTIVE | Noted: 2024-08-20

## 2024-08-20 LAB
ALBUMIN SERPL BCG-MCNC: 3.9 G/DL (ref 3.5–5)
ALP SERPL-CCNC: 47 U/L (ref 34–104)
ALT SERPL W P-5'-P-CCNC: 18 U/L (ref 7–52)
ANION GAP SERPL CALCULATED.3IONS-SCNC: 10 MMOL/L (ref 4–13)
AST SERPL W P-5'-P-CCNC: 31 U/L (ref 13–39)
ATRIAL RATE: 101 BPM
BILIRUB SERPL-MCNC: 0.82 MG/DL (ref 0.2–1)
BUN SERPL-MCNC: 7 MG/DL (ref 5–25)
CALCIUM SERPL-MCNC: 7.7 MG/DL (ref 8.4–10.2)
CHLORIDE SERPL-SCNC: 104 MMOL/L (ref 96–108)
CO2 SERPL-SCNC: 22 MMOL/L (ref 21–32)
CREAT SERPL-MCNC: 0.58 MG/DL (ref 0.6–1.3)
ERYTHROCYTE [DISTWIDTH] IN BLOOD BY AUTOMATED COUNT: 13.7 % (ref 11.6–15.1)
FERRITIN SERPL-MCNC: 56 NG/ML (ref 11–307)
GFR SERPL CREATININE-BSD FRML MDRD: 115 ML/MIN/1.73SQ M
GLUCOSE SERPL-MCNC: 90 MG/DL (ref 65–140)
HCT VFR BLD AUTO: 33 % (ref 34.8–46.1)
HGB BLD-MCNC: 11.8 G/DL (ref 11.5–15.4)
IRON SERPL-MCNC: 418 UG/DL (ref 50–212)
MAGNESIUM SERPL-MCNC: 1.7 MG/DL (ref 1.9–2.7)
MCH RBC QN AUTO: 32.9 PG (ref 26.8–34.3)
MCHC RBC AUTO-ENTMCNC: 35.8 G/DL (ref 31.4–37.4)
MCV RBC AUTO: 92 FL (ref 82–98)
P AXIS: 58 DEGREES
PLATELET # BLD AUTO: 184 THOUSANDS/UL (ref 149–390)
PMV BLD AUTO: 8.4 FL (ref 8.9–12.7)
POTASSIUM SERPL-SCNC: 3.5 MMOL/L (ref 3.5–5.3)
PR INTERVAL: 148 MS
PROT SERPL-MCNC: 6.5 G/DL (ref 6.4–8.4)
QRS AXIS: 19 DEGREES
QRSD INTERVAL: 74 MS
QT INTERVAL: 354 MS
QTC INTERVAL: 459 MS
RBC # BLD AUTO: 3.59 MILLION/UL (ref 3.81–5.12)
SODIUM SERPL-SCNC: 136 MMOL/L (ref 135–147)
T WAVE AXIS: 26 DEGREES
UIBC SERPL-MCNC: <55 UG/DL (ref 155–355)
VENTRICULAR RATE: 101 BPM
WBC # BLD AUTO: 4.83 THOUSAND/UL (ref 4.31–10.16)

## 2024-08-20 PROCEDURE — 99232 SBSQ HOSP IP/OBS MODERATE 35: CPT

## 2024-08-20 PROCEDURE — 80053 COMPREHEN METABOLIC PANEL: CPT

## 2024-08-20 PROCEDURE — 83550 IRON BINDING TEST: CPT

## 2024-08-20 PROCEDURE — 93010 ELECTROCARDIOGRAM REPORT: CPT | Performed by: INTERNAL MEDICINE

## 2024-08-20 PROCEDURE — 82728 ASSAY OF FERRITIN: CPT

## 2024-08-20 PROCEDURE — 85027 COMPLETE CBC AUTOMATED: CPT

## 2024-08-20 PROCEDURE — 83540 ASSAY OF IRON: CPT

## 2024-08-20 PROCEDURE — 83735 ASSAY OF MAGNESIUM: CPT

## 2024-08-20 RX ORDER — GABAPENTIN 300 MG/1
300 CAPSULE ORAL 3 TIMES DAILY
Status: DISCONTINUED | OUTPATIENT
Start: 2024-08-20 | End: 2024-08-21 | Stop reason: HOSPADM

## 2024-08-20 RX ORDER — FOLIC ACID 1 MG/1
1 TABLET ORAL DAILY
Status: DISCONTINUED | OUTPATIENT
Start: 2024-08-21 | End: 2024-08-21 | Stop reason: HOSPADM

## 2024-08-20 RX ORDER — HYDROXYZINE HYDROCHLORIDE 50 MG/1
50 TABLET, FILM COATED ORAL EVERY 6 HOURS PRN
Status: DISCONTINUED | OUTPATIENT
Start: 2024-08-20 | End: 2024-08-21 | Stop reason: HOSPADM

## 2024-08-20 RX ORDER — CLONAZEPAM 1 MG/1
1 TABLET ORAL 2 TIMES DAILY
Status: DISCONTINUED | OUTPATIENT
Start: 2024-08-20 | End: 2024-08-21 | Stop reason: HOSPADM

## 2024-08-20 RX ORDER — MAGNESIUM SULFATE HEPTAHYDRATE 40 MG/ML
2 INJECTION, SOLUTION INTRAVENOUS ONCE
Status: COMPLETED | OUTPATIENT
Start: 2024-08-20 | End: 2024-08-20

## 2024-08-20 RX ORDER — LANOLIN ALCOHOL/MO/W.PET/CERES
100 CREAM (GRAM) TOPICAL DAILY
Status: DISCONTINUED | OUTPATIENT
Start: 2024-08-21 | End: 2024-08-21 | Stop reason: HOSPADM

## 2024-08-20 RX ORDER — PHENOBARBITAL SODIUM 130 MG/ML
130 INJECTION, SOLUTION INTRAMUSCULAR; INTRAVENOUS ONCE
Status: COMPLETED | OUTPATIENT
Start: 2024-08-20 | End: 2024-08-20

## 2024-08-20 RX ADMIN — SODIUM CHLORIDE 100 ML/HR: 0.9 INJECTION, SOLUTION INTRAVENOUS at 05:23

## 2024-08-20 RX ADMIN — ENOXAPARIN SODIUM 40 MG: 40 INJECTION SUBCUTANEOUS at 08:12

## 2024-08-20 RX ADMIN — PHENOBARBITAL SODIUM 130 MG: 130 INJECTION INTRAMUSCULAR; INTRAVENOUS at 01:13

## 2024-08-20 RX ADMIN — CLONAZEPAM 1 MG: 1 TABLET ORAL at 18:10

## 2024-08-20 RX ADMIN — GABAPENTIN 300 MG: 300 CAPSULE ORAL at 16:19

## 2024-08-20 RX ADMIN — FOLIC ACID: 5 INJECTION, SOLUTION INTRAMUSCULAR; INTRAVENOUS; SUBCUTANEOUS at 08:12

## 2024-08-20 RX ADMIN — HYDROXYZINE HYDROCHLORIDE 50 MG: 50 TABLET, FILM COATED ORAL at 20:32

## 2024-08-20 RX ADMIN — MAGNESIUM SULFATE HEPTAHYDRATE 2 G: 2 INJECTION, SOLUTION INTRAVENOUS at 08:12

## 2024-08-20 RX ADMIN — GABAPENTIN 300 MG: 300 CAPSULE ORAL at 20:31

## 2024-08-20 RX ADMIN — MULTIPLE VITAMINS W/ MINERALS TAB 1 TABLET: TAB ORAL at 08:12

## 2024-08-20 RX ADMIN — ESCITALOPRAM OXALATE 20 MG: 10 TABLET, FILM COATED ORAL at 08:12
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
## 2024-08-20 NOTE — ASSESSMENT & PLAN NOTE
Pt with a h/o chronic heavy alcohol use   Drinks 1/5th of liquor daily  Previous admission to the [facility] Medical Detox Unit   Denies H/o withdrawal seizures  Contemplating naltrexone at this time  Withdrawal management as above  Initiate IVFs, IV thiamine, folic acid, and MVI  Consult case management/CRS for assistance with aftercare resources - pt interested in inpatient drug and alcohol rehab at this time

## 2024-08-20 NOTE — PROGRESS NOTES
Progress Note - Medical Toxicology    Mckenna Brown 39 y.o. female MRN: [id number]  Unit/Bed#: 5T DETOX 510-01 Encounter: [id number]  MEDICAL DETOX UNIT, LEVEL 4  Department of Medical Toxicology  Reason for Admission/Principal Problem: alcohol withdrawal   Rounding Provider: Lluvia Don PA-C, Melo Ramirez, DO         * Alcohol withdrawal (HCC)  Assessment & Plan  H/o chronic daily alcohol consumption, denies h/o withdrawal seizures  Last drink: 8/19/24   Ethanol lvl: 161 8/19/24 146/99   Continue SEWS protocol with symptom-triggered phenobarbital for medically-assisted alcohol withdrawal  Current symptoms include anxiety  Withdrawal appears well controlled at present   Will likely discontinue protocol this afternoon   Total pheno: 520 mg   Will initiate thiamine, folic acid, and multivitamin supplementation   Continue to monitor vitals, symptoms      Benzodiazepine dependence (HCC)  Assessment & Plan  Patient with a h/o chronic benzodiazepine use  Using Klonopin  2mg daily   Denies H/o benzo withdrawal seizures  Withdrawal management under SEWS protocol as above as patient is no longer interested in continuing   Recommend f/u with OP Psychiatry for continued psychiatric care  Consult case management/CRS for assistance with aftercare resources - pt interested in inpatient drug and alcohol after discharge  Encourage cessation of benzodiazepine use     Hypomagnesemia  Assessment & Plan  Mag 1.7 on admission  K 3.5  Repleted with IV magnesium sulfate 2 g  Continue monitoring and replete electrolytes as indicated     Iron deficiency anemia  Assessment & Plan  History of CHENG   Iron panel pending  Continue to monitor and initiate ferrous sulfate if needed     Anxiety and depression  Assessment & Plan  Patient endorses a h/o chronic anxiety and depression  Home medications include: Lexapro 20 mg, Klonpin 1mg BID   Denies SI/HI/thoughts of self harm  Continue home medications  Recommend OP f/u with  PCP/OP Psychiatry     Alcohol use disorder  Assessment & Plan  Pt with a h/o chronic heavy alcohol use   Drinks 1/5th of liquor daily  Previous admission to the [facility]   Denies H/o withdrawal seizures  Contemplating naltrexone at this time  Withdrawal management as above  Initiate IVFs, IV thiamine, folic acid, and MVI  Consult case management/CRS for assistance with aftercare resources - pt interested in inpatient drug and alcohol rehab at this time             VTE Pharmacologic Prophylaxis:   Pharmacologic: Enoxaparin (Lovenox)  Mechanical VTE Prophylaxis in Place: no    Code Status: Level 1 - Full Code    Patient Centered Rounds: I have performed bedside rounds with nursing staff today.    Discussions with Specialists or Other Care Team Provider: Case Management    Education and Discussions with Family / Patient: I personally answered all of the patient's questions to the best of my ability     Time Spent for Care: 20 minutes.  More than 50% of total time spent on counseling and coordination of care as described above.    Current Length of Stay: 1 day(s)    Current Patient Status: Inpatient     Certification Statement: The patient will continue to require additional inpatient hospital stay due to alcohol withdrawal   Discharge Plan: Anticipated Discharge within 24-48 hours         Subjective:   Patient seen and examined at bedside. She is resting comfortably. Denies any anxiety, nausea, vomiting, or diarrhea.     Objective:     Clinical Opiate Withdrawal Scale  Pulse: 70    SEWS Total Score: 0 (8/20/2024  1:38 PM)        Last 24 Hours Medication List:   Current Facility-Administered Medications   Medication Dose Route Frequency Provider Last Rate    acetaminophen  650 mg Oral Q6H PRN Lluvia Don PA-C      acetaminophen  650 mg Oral Q6H PRN Lluvia Don PA-C      enoxaparin  40 mg Subcutaneous Daily Lluvia Don PA-C      escitalopram  20 mg Oral Daily Emmie Jordan  JUS Hobson      [START ON 2024] folic acid  1 mg Oral Daily Lluviagabi Don PA-C      gabapentin  300 mg Oral HS Obioma Julian JUS Hobson      multivitamin-minerals  1 tablet Oral Daily Lluvia Don PA-C      ondansetron  4 mg Intravenous Q6H PRN Lluvia Don PA-C      sodium chloride  100 mL/hr Intravenous Continuous Lluvia Don PA-C 100 mL/hr (24 0523)    [START ON 2024] thiamine  100 mg Oral Daily Lluvia Don PA-C           Vitals:   Temp (24hrs), Av °F (36.7 °C), Min:97.6 °F (36.4 °C), Max:98.4 °F (36.9 °C)    Temp:  [97.6 °F (36.4 °C)-98.4 °F (36.9 °C)] 97.8 °F (36.6 °C)  HR:  [] 70  Resp:  [16-18] 16  BP: (108-148)/(63-99) 129/77  SpO2:  [94 %-100 %] 100 %  Body mass index is 22.1 kg/m².     Input and Output Summary (last 24 hours):  Intake/Output Summary (Last 24 hours) at 2024 1437  Last data filed at 2024 1809  Gross per 24 hour   Intake 1000 ml   Output --   Net 1000 ml       Physical Exam:   Physical Exam  Vitals and nursing note reviewed.   Constitutional:       General: She is not in acute distress.     Appearance: Normal appearance. She is not ill-appearing.   HENT:      Head: Normocephalic and atraumatic.   Cardiovascular:      Rate and Rhythm: Normal rate and regular rhythm.   Pulmonary:      Effort: Pulmonary effort is normal. No respiratory distress.      Breath sounds: Normal breath sounds. No wheezing, rhonchi or rales.   Abdominal:      General: Bowel sounds are normal. There is no distension.      Tenderness: There is no abdominal tenderness.   Musculoskeletal:      Right lower leg: No edema.      Left lower leg: No edema.   Skin:     General: Skin is warm and dry.   Neurological:      Mental Status: She is alert and oriented to person, place, and time.         Additional Data:     Labs: keep all most recent labs as listed on admission templates   Results from last 7 days   Lab Units 24  9537  08/19/24  1704   WBC Thousand/uL 4.83 4.21*   HEMOGLOBIN g/dL 11.8 11.7   HEMATOCRIT % 33.0* 34.2*   PLATELETS Thousands/uL 184 211   SEGS PCT %  --  48   LYMPHO PCT %  --  37   MONO PCT %  --  11   EOS PCT %  --  3      Results from last 7 days   Lab Units 08/20/24  0522   SODIUM mmol/L 136   POTASSIUM mmol/L 3.5   CHLORIDE mmol/L 104   CO2 mmol/L 22   BUN mg/dL 7   CREATININE mg/dL 0.58*   ANION GAP mmol/L 10   CALCIUM mg/dL 7.7*   ALBUMIN g/dL 3.9   TOTAL BILIRUBIN mg/dL 0.82   ALK PHOS U/L 47   ALT U/L 18   AST U/L 31   GLUCOSE RANDOM mg/dL 90                              * I Have Reviewed All Lab Data Listed Above.  * Additional Pertinent Lab Tests Reviewed: All Labs For Current Hospital Admission Reviewed      Imaging Studies: I have personally reviewed pertinent reports.        Recent Cultures (last 7 days):          Today, Patient Was Seen By: Lluvia Don PA-C    ** Please Note: Dictation voice to text software may have been used in the creation of this document. **

## 2024-08-20 NOTE — ASSESSMENT & PLAN NOTE
History of CHENG   Iron panel pending  Continue to monitor and initiate ferrous sulfate if needed

## 2024-08-20 NOTE — ASSESSMENT & PLAN NOTE
Patient with a h/o chronic benzodiazepine use  Using Klonopin  2mg daily   Denies H/o benzo withdrawal seizures  Withdrawal management under SEWS protocol as above as patient is no longer interested in continuing   Recommend f/u with OP Psychiatry for continued psychiatric care  Consult case management/CRS for assistance with aftercare resources - pt interested in inpatient drug and alcohol after discharge  Encourage cessation of benzodiazepine use

## 2024-08-20 NOTE — ASSESSMENT & PLAN NOTE
Patient endorses a h/o chronic anxiety and depression  Home medications include: Lexapro 20 mg, Klonpin 1mg BID   Denies SI/HI/thoughts of self harm  Continue home medications  Recommend OP f/u with PCP/OP Psychiatry

## 2024-08-20 NOTE — CERTIFIED RECOVERY SPECIALIST
Certified  Note    Patient name: Mckenna Brown  Location: 5T DETOX 510/5T DETOX 51*  [facility]: [facility]  Attending:  Melo Ramirez DO MRN [id number]  : 1985  Age: 39 y.o.    Sex: female Date 2024         Substance Use History:     Social History     Substance and Sexual Activity   Alcohol Use Yes    Comment: 1/2 bottle vodka/day        Social History     Substance and Sexual Activity   Drug Use Yes    Types: Marijuana, Benzodiazepines        CRS attempted to engage, patient resting comfortably.     CRS will continue to follow until discharge     Resources provided         Pam Disla

## 2024-08-20 NOTE — PLAN OF CARE
Problem: SUBSTANCE USE/ABUSE  Goal: By discharge, will develop insight into their chemical dependency and sustain motivation to continue in recovery  Description: INTERVENTIONS:  - Attends all daily group sessions and scheduled AA groups  - Actively practices coping skills through participation in the therapeutic community and adherence to program rules  - Reviews and completes assignments from individual treatment plan  - Assist patient development of understanding of their personal cycle of addiction and relapse triggers  Outcome: Progressing  Goal: By discharge, patient will have ongoing treatment plan addressing chemical dependency  Description: INTERVENTIONS:  - Assist patient with resources and/or appointments for ongoing recovery based living  Outcome: Progressing     Problem: DISCHARGE PLANNING  Goal: Discharge to home or other facility with appropriate resources  Description: INTERVENTIONS:  - Identify barriers to discharge w/patient and caregiver  - Arrange for needed discharge resources and transportation as appropriate  - Identify discharge learning needs (meds, wound care, etc.)  - Arrange for interpretive services to assist at discharge as needed  - Refer to Case Management Department for coordinating discharge planning if the patient needs post-hospital services based on physician/advanced practitioner order or complex needs related to functional status, cognitive ability, or social support system  Outcome: Progressing     Problem: Knowledge Deficit  Goal: Patient/family/caregiver demonstrates understanding of disease process, treatment plan, medications, and discharge instructions  Description: Complete learning assessment and assess knowledge base.  Interventions:  - Provide teaching at level of understanding  - Provide teaching via preferred learning methods  Outcome: Progressing

## 2024-08-20 NOTE — PLAN OF CARE
Problem: SUBSTANCE USE/ABUSE  Goal: By discharge, will develop insight into their chemical dependency and sustain motivation to continue in recovery  Description: INTERVENTIONS:  - Attends all daily group sessions and scheduled AA groups  - Actively practices coping skills through participation in the therapeutic community and adherence to program rules  - Reviews and completes assignments from individual treatment plan  - Assist patient development of understanding of their personal cycle of addiction and relapse triggers  8/20/2024 1950 by João Sr RN  Outcome: Progressing  8/20/2024 1950 by João Sr RN  Outcome: Progressing  Goal: By discharge, patient will have ongoing treatment plan addressing chemical dependency  Description: INTERVENTIONS:  - Assist patient with resources and/or appointments for ongoing recovery based living  8/20/2024 1950 by João Sr RN  Outcome: Progressing  8/20/2024 1950 by João Sr RN  Outcome: Progressing     Problem: DISCHARGE PLANNING  Goal: Discharge to home or other facility with appropriate resources  Description: INTERVENTIONS:  - Identify barriers to discharge w/patient and caregiver  - Arrange for needed discharge resources and transportation as appropriate  - Identify discharge learning needs (meds, wound care, etc.)  - Arrange for interpretive services to assist at discharge as needed  - Refer to Case Management Department for coordinating discharge planning if the patient needs post-hospital services based on physician/advanced practitioner order or complex needs related to functional status, cognitive ability, or social support system  8/20/2024 1950 by João Sr RN  Outcome: Progressing  8/20/2024 1950 by João Sr RN  Outcome: Progressing     Problem: Knowledge Deficit  Goal: Patient/family/caregiver demonstrates understanding of disease process, treatment plan, medications, and discharge  instructions  Description: Complete learning assessment and assess knowledge base.  Interventions:  - Provide teaching at level of understanding  - Provide teaching via preferred learning methods  8/20/2024 1950 by João Sr RN  Outcome: Progressing  8/20/2024 1950 by João Sr RN  Outcome: Progressing

## 2024-08-20 NOTE — DISCHARGE INSTR - OTHER ORDERS
Pam Disla   Certified     [facility], [place] [facility]  [phone]    [facility]  [address]  [place]  [address]  [phone]    MARS  [address] [place] [address]  [phone]    [address]  [place] [address]    [place]   [address]  [place] [address]    [facility]   [address]  [place] [address]   [phone]    [place]   [address] [place] [address]  [phone]    CRISIS INFORMATION  If you are experiencing a mental health emergency, you may call the [facility] Crisis Intervention Office 24 hours a day, 7 days per week at [phone].    In [facility], call [phone].    [employer] is a confidential 24/7 telephone support service manned by trained mental health consumers.  Warmline provides support, a listening ear and can provide information about available services.[facility] specializes in the concerns of mental health consumers, their families and friends.  However, we are also here for anyone who has a mental health concern, is confused about or just doesn't know anything about mental health or where to get information.  To reach [facility], call [phone].    HOW TO GET SUBSTANCE ABUSE HELP:  If you or someone you know has a drug or alcohol problem, there is help:  [facility] Drug & Alcohol Abuse Services: [phone]  [facility] Drug & Alcohol Abuse Services: [phone]  An assessment is the first step.   In addition to those listed there are other programs available in the area but assessment is best to determine an appropriate level of care.  If you DO NOT have Medical Assistance (MA) or Private Insurance, an assessment can be scheduled at one of these providers:  [facility]  [address] [place], [place] [address] [phone]   [place] [address] [address],  [place] [address]  [phone]   [facility]  [address] [facility] [place] [address]  [phone]   [facility]  [address] [place] [address]  [phone]   [facility]  [address] [place] [address]  [phone]   [facility]  [address] [place] [address]  [phone]   [facility]  [address] [address]  [phone]     If you HAVE Medical Assistance, an assessment can be scheduled at one of these providers:  [facility]  [address] [facility] [place] [address]  [phone]   [facility]  [address] [place] [address]  [phone]   [facility]  [address] 1st Floor, [place] [address]  [phone]  [address] [place] [address], [address] [address] [phone]   [facility]  [address] [place] [address] [place] [address]  [phone]   [facility]  [address] [facility] [address]  [phone]   [facility] ([facility])  [address] [place] [address]  [phone]   [facility]  [address] [place] [address]  [phone]   [facility]  [address] [place] [address] [place] [address]  [phone]   [facility]  [address] [facility] [place] [address]  [phone]   [facility]  [address], [place] [address]  [phone]     If you HAVE Private Insurance, an assessment can be scheduled at one of these providers.  Please contact these Providers to determine if they are in your network plan:  [facility]  [address] [facility], 1st Floor, [place] [address]  [phone]   [facility]  [address], [place] [address]  [phone]   [facility]  [address] [place] [address]. [place] [address]  [phone]   NET ([facility])  [address] ,  [place] [address]  [phone]   [facility]  [address] [facility] [address] [place] [address]  [phone]   John Posadas.  [address] [facility], [place] [address]  [phone]     From the [facility] website www.[place].gov/guides/opioid-epidemic/#GetNaloxone    How do I get naloxone?  Family members and friends can access naloxone by:    Obtaining a prescription from their family doctor  Using the standing order issued by Acting Physician General Cleo Judge. A standing order is a prescription written for the general public, rather than specifically for an individual.  To use the standing order, print it and take it with you to the pharmacy or have the digital version on your phone. Download the standing order from the Department of [facility] (PDF).    If you are unable to print it or use the digital version, the standing order is kept on file at many pharmacies. If a pharmacy does not have it on file, they may have the ability to look it up.    Naloxone prescriptions can be filled at most pharmacies. Although the medication might not be available for same-day pickup, it often can be ordered and available within a day or two.    SYNC Recovery Events    [facility] organizes meaningful events for people in recovery and their families. Our main goal is to have fun by connecting with nature and other people. We can then realize that there is a world of possibilities open to us, now that we are no longer in the bondage of addiction. These events are designed to provide :  Hope for those in early recovery  Tangible proof that life gets better when we’re sober  Service opportunities for people with recovery experience  Social connectedness for everyone involved    [address]  [place] [address]  Phone: [phone]  Email: info@[facility].org   Website: https://[place].org/    SMART Recovery Meetings    Self Management and Recovery  "Training (SMART)  SMART Recovery is an evidenced-informed recovery method grounded in Rational Emotive Behavioral Therapy (REBT) and Cognitive Behavioral Therapy (CBT), that supports people with substance dependencies or problem behaviors to:  Build and maintain motivation  [place] with urges and cravings  Manage thoughts, feelings and behaviors  Live a balanced life    Find meetings and tools: https://smartrecSaint Johns [facility]ude [facility] Memorial Hospitaly.org/      [facility]  \"Treating Addiction...Improving Our Community...\"  [facility] utilizes a multidisciplinary approach to assessment & treatment for men, women and adolescents (over the age of 12) who are struggling with problems of substance abuse, addiction and associated issues. Treatment is individualized to meet client's needs and goals. Slovenian-speaking services are available (both individual and groups).  Services are provided by Certified Recovery Specialists for adults who desire support to maintain recovery from addiction.  [facility] provides drug & alcohol assessments (in Slovenian and English) to determine the level of care that is needed.  Walk-in assessment hours are Monday-Friday, 8:30 a.m. - 2:00 p.m. This is available for both adolescents and adults.  Groups include:  Psycho-dynamic/Process Therapy Group (Men's & Women's)  Relapse Prevention and Building Recovery  Motivational Enhancement  Slovenian Groups  Seeking Safety (Trauma/Substance Abuse)  Addiction Awareness  Conflict Resolution  Criminality  Intervention Group  Criminal thinking & behavior are proactively addressed. Fathering & Restorative Parenting Groups are offered periodically. Basic life skills training such as budgeting, resume preparation, employment interviewing, & vocational referrals are also part of the [facility] program. Daily yoga is available.    [address] [place] [address], [place] [address], Phone: [phone]   Fax: [phone]    [facility]    Walk-In [facility]  [address]st " "[place] [address]  Fridays 10am to 12pm  [phone]     Our [place]  Abstinence from mind/mood altering chemicals is only one part of recovery. We recognize the need for a holistic approach, which promotes both physical and mental wellness. We utilize a collaborative process which allows each individual to have a voice in their client-centered recovery plan. We understand that recovery is not a \"one size fits all\" concept. Therefore, each recovery plan is customized to the specific needs of the individual. Our goal is to assist in removing obstacles which may prevent a lifestyle of recovery. By traveling your path of recovery with you, we will help motivate engagement in the treatment process, assist in developing and enhancing life skills, and facilitate independence through positive change.       What is RSS (Recovery Support Services)?   Recovery Support Services is a peer to peer service offered to individuals seeking recovery from addiction. Certified Recovery Specialists (CRS) help guide individuals in establishing recovery supports, accessing treatment, and getting their basic needs met. Certified Recovery Specialists draw on their personal experience in collaborating with individuals in the office, in treatment, and in the community to removed obstacles to getting and staying clean and sober. The collaborative journey between the individual and their CRS will address basic needs, as well as enhance the individual's efforts to get and stay clean. They will provide you with support and guidance, and advocate for you when needed. They can introduce you to recovery supports within the local community (12-step groups, yoga, martial arts, Voodoo groups, art classes, etc.), and even participate in these activities with you. There are many pathways to recovery and a CRS will explore them with you.     What is a Certified  (CRS)?   These are individuals who have a shared recovery " experience, either by being in recovery themselves, or by having a loved one in recovery. They are certified through [place] Certification Board after numerous trainings and an exam. Their goal is to collaborate with you in identifying the supports you need to achieve recovery for yourself.     Areas in which they may be able to assist you:  Accessing support groups  Getting linked to recovery supportive activities  Basic needs (food, clothing, etc.)  Applying for health insurance  Employment  Literacy classes  Bus passes  Housing assistance  Etc.    Why should I work with a ?   If you have tried to get engaged in recovery before and haven't been able to, or feel you could really use some extra support and guidance with the journey, then working with a CRS is for you.     Am I eligible for RSS?   Ask your current counselor and any staff at [facility] Drug and Alcohol Intake [facility] or the  [facility].     How much does the service cost?   Currently, this service is at no cost to those who have [facility] or [facility] or if you are a [facility] resident.   Local Food Bank Locations & Contact Information:  [facility] Food [place]  Municipalities:  [place], [place], [place], [place], [place], [place], [place], [place],  [place], and [place]  Emergency Food Pantries  [address]  Address: [address] [place] [address]  Phone: [phone]  Hours of Operation: Fridays 10:00AM-1:00PM  [facility]  Address: [address] [address]  Phone: [phone]  Hours of Operation: Thursdays 5:30PM-6:30PM    [place] [id number]  [place] [facility] [employer]  Address: [address] [place] [address]  Phone: [phone]  Hours of Operation: Monday-Friday 9:30AM-12:00PM  [facility]  Address: [address] [address]  Phone:  [phone]  Hours of Operation: By appointment -- Mondays, Tuesdays, Thursdays, & Fridays 8:30AM-4:00PM;  Wednesdays 8:30AM-12:00PM  [place] Elsa  Address: [address] [address]  Phone: [phone]  Hours of Operation: 1st & 3rd Saturdays 10:00AM-12:00PM  [facility]  Address: [address] [place] [address]  Phone: [phone]  Hours of Operation: 2nd & 4th Thursdays 4:00PM-5:30PM (Only 4th Thursdays during the summer)  Judaism Judaism Mongolian Hindu  Address: [address] [place] [address]  Phone: [phone]  Hours of Operation: By appointment -- Wednesdays 6:00PM  [facility]  Address: [address] [place] [address]  Phone: [phone]  Hours of Operation: Thursdays 11:00AM-2:00PM or By appointment  [facility]  Address: [address] [address]  Phone: [phone]  Hours of Operation: Saturdays 9:00AM-11:30AM  Yolis Yazidi Hindu  Address: [address] [address]  Phone: [phone]  Hours of Operation: Fridays, 3rd & 4th Saturdays 9:00AM-11:00AM  Eric Plascencia Pentecostales  Address: [address] [place] [address]  Phone: [phone]  Hours of Operation: Tuesdays 3:00PM-5:00PM or By appointment  [facility]  Address: [address] [place] [address]  Phone: [phone]  Hours of Operation: By appointment  [facility] Latter day  Address: [address] [place] [address]  Phone: [phone]  Hours of Operation: 3rd Saturday, 8:00AM-11:00AM  RIPPLE  Address: [address] [place] [address]  Phone: [phone]  Hours of Operation: 3rd Sunday 4:00PM-5:30PM    Albanian [facility] [employer])  Address: [address] [address]  Phone: [phone]  Hours of Operation: 3rd Wednesday 9:00AM-4:00PM  [facility]  Address: [facility] [place] [address]  Phone:  [phone]  Hours of Operation: 1st & 3rd Saturdays 9:00AM-11:30AM  [facility]  Address: [address] [address]  Phone: [phone]  Hours of Operation: By appointment  [place] - [address]  [place] [employer]  Address: [address] [address]  Phone: [phone]  Hours of Operation: 3rd Sunday 12:00PM-1:30PM  [facility]  Address: [address] [facility] [place] [address]  Phone: [phone]  Hours of Operation: 1st & 3rd Thursdays 6:30PM-7:30PM; By appointment -- Mondays  [facility]  Address: [address] [place] [address]  Phone: [phone]  Hours of Operation: By appointment  [facility]  Address: [address] [place] [address]  Phone: [phone]  Hours of Operation: 1st & 3rd Wednesdays 4:00PM-5:30PM  [place] [facility]  Address: [address] [address]  Phone: [phone]  Hours of Operation: 4th Wednesday 6:30PM-7:30PM  [facility]  Address: [address] [place] [address]  Phone: [phone]  Hours of Operation: 2nd Tuesday 10:00AM-12:00PM; 4th Thursday 4:00PM-6:00PM  [place] Saleh [address] [place] [address] Family Services (Kosher & Non-Kosher)  Address: [address] [facility], [address]  Phone: [phone]  Hours of Operation: By appointment -- Monday-Friday 9:00AM-5:00PM  [place] [address]  [facility]  Address: [address] [place] [address]  Phone: [phone]  Hours of Operation: 1st & 3rd Tuesdays 9:00AM-10:30AM; Thursdays 6:00PM-8:00PM    [facility]  Address: [address] [facility], [place] [address]  Phone: [phone]  Hours of Operation: Every other Saturday 10:00AM-12:00PM  [facility]  Address: [address] [place] [address], [place] [address]  Phone: [phone]  Hours of Operation: 3rd Monday 6:00PM-7:30PM  [facility] [address]  Delbert Healy  [facility]  Address: [address] [place] [address]  Phone: [phone]  Hours of Operation: 4th Sunday 9:00AM-11:00AM  [address] Eric  Address: [address] [place] [address]  Phone: [phone]  Hours of Operation: Saturdays 10:30AM-12:30PM  [facility]  Address: [address] [place] [address]  Phone: [phone]  Hours of Operation: By appointment -- Tuesday-Thursday 8:30AM-4:30PM  [facility]  Address: [address] [place] [address]  Phone: [phone]  Hours of Operation: 1st & 3rd Wednesdays 6:00PM-8:00PM  [place] - [address]  [facility]  Address: [address] [place] [address]  Phone: [phone]  Hours of Operation: Wednesdays & Fridays 3:00PM-4:00PM  [place] - [address]  Gerri Alvina’s Pantry  Address: [address] [place] [address]  Phone: [phone]  Hours of Operation: Every other Saturday 10:30AM-12:30PM  [place] - [address]  [facility]avier [facility]  Address: [address] [facility] [place] [address]  Phone: [phone]  Hours of Operation: Tuesdays & Wednesdays 10:00AM-2:00PM; Closed last week of the month  [place]avan Amaral - [address]  [facility]  Address: [facility] [place] [address]  Phone: [phone]  Hours of Operation: 1st Saturday 9:00AM-12:00PM; 3rd Thursday 4:00PM-7:00PM    [place] - [address]  [facility] Food Bank  Address: [address] [facility], [place] [address]  Phone: [phone]  Hours of Operation: 3rd Monday & 3rd Wednesday 4:00PM-6:00PM; 3rd Saturday 10:00AM-12:00[address]  Address: [address] [place] [address]  Phone: [phone]  Hours of Operation: 1st & 3rd Mondays 9:00AM-11:30AM  [facility]  [facility]  Address: [address] [place] [address] [place] [address]  Phone: [phone]  Hours of Operation: 2nd Saturday 9:00AM-11:00AM  [address]  [place] [facility]  Address: [address] [place] [address]  Phone: [phone]  Hours of Operation: 1st, 2nd, & 3rd Thursdays 4:00PM-7:00PM; Last Saturday 9:30AM-12:00PM  [place] - [address]  [facility]  Address: [address] [address]  Phone: [phone]  Hours of Operation: Tuesdays 6:00PM-7:00PM; Saturdays 4:00PM-5:00PM; Sundays 12:30PM-1:30PM  [place] [facility]  Address: [address] [place] [address]  Phone: [phone]  Hours of Operation: By appointment -- Mondays 6:00PM      [place]oup [place]  [facility]  Address: [address] [place] [address]  Phone: [phone]  Hours of Operation: Monday-Friday 1:30PM-2:30PM  [facility])  Address: [address] [place] [address]  Phone: [phone]  Hours of Operation: Monday-Friday 9:00AM-5:00PM  [facility] Soup [employer]  Address: [address] [address]  Phone: [phone]  Hours of Operation: Tuesday-Thursday 12:00PM-1:00PM    [facility]  Address: [address] [address]  Phone: [phone]  Hours of Operation: Sundays 8:00AM-9:00AM; Some holidays  [facility] Food [place]  Municipalities:  [place], [place], [place], [place], [place], [place], and [place]  Emergency Food Pantries  [place] - [address]  [facility] Food Bank  Address: [address] [place] [address]  Phone: [phone]  Hours of Operation: 2nd Tuesday 10:00AM-12:00PM  [place] - [address]  [facility]  Address: [address] [facility], [place] [address]  Phone: [phone]  Hours of Operation: Wednesdays 10:00AM-12:00PM  León Cook [facility]  Address: [address] [place] [address], [place] [address]  Phone: [phone]  Hours of Operation: 1st & 3rd Tuesdays 5:00PM-6:00PM  [facility]  Address: [address] [place] [address], [place], [address]  Phone: [phone]  Hours of Operation: Tuesdays 6:00PM-7:00PM  Holy Javier  Religious  Address: [address], [place] [address]  Phone: [phone]  Hours of Operation: 1st & 2nd Saturdays 12:00PM- 4:00PM  [facility] Pantry  Address: [address], [place] [address]  Phone: [phone]  Hours of Operation: Monday-Friday 10:30AM-11:30AM  [facility]  Address: [address], [place] [address]  Phone: [phone]  Hours of Operation: 3rd & 4th Saturdays 9:00AM-11:00AM; [facility] residents only    [place] - [address]  [place] [facility]  Address: [address] [facility], [place] [address]  Phone: [phone]  Hours of Operation: 2nd Thursday 10:00AM-12:00PM  [facility] Pant[facility]  Address: [address] [facility], [place] [address]  Phone: [phone]  Hours of Operation: Monday & Tuesday of the first full week of the month 9:00AM-11:00AM  [facility]  Address: [address] [facility], [place] [address]  Phone: [phone]  Hours of Operation: Mondays, Wednesdays, & Thursdays 9:30AM-11:30AM  [facility]  Address: [address] [facility], [place] [address]  Phone: [phone]  Hours of Operation: 2nd & 4th Saturdays 10:00AM-12:00PM  [place] - [address]  [place] [facility]  Address: [address] [facility], [place] [address]  Phone: [phone]  Hours of Operation: By appointment -- Tuesdays & Wednesdays 10:00AM-4:00PM, Thursdays 10:00AM-  12:00PM, & Sundays 4:00PM-7:00PM  [facility]  Address: [address] [facility], [place] [address]  Phone: [phone]  Hours of Operation: 3rd Saturday 10:00AM-12:00PM  NIDIA Luz  Address: [address] [facility], [place] [address]  Phone: [phone]  Hours of Operation: 3rd Saturday 9:00AM-11:30AM or by appointment  [facility]  Address: [address] [facility] [place] [address]  Phone: [phone]  Hours of Operation: 2nd & 4thTuesdays 10:00AM-12:00PM  [facility] Pant[facility]  Address: [address] [facility], [place]  [address]  Phone: [phone]  Hours of Operation: 1st, 2nd, & 4th Wednesdays 11:00AM-1:00PM; 3rd Wednesday 5:00PM-7:00PM  [facility]  Address: [address] [place] [address] [place] [address]  Phone: [phone]  Hours of Operation: Wednesdays 10:00AM-12:00PM; Last Wednesday 6:00PM-8:00PM  [address]  Address: [address] [place] [address]  Phone: [phone]  Hours of Operation: Fridays 8:30AM-11:30AM & 1:30PM-3:30PM    [facility]  Address: [address] [place] [address]  Phone: [phone]  Hours of Operation: By appointment--Mondays-Fridays 9:00AM -4:30PM.  [facility]  Address: [address] [place] [address]  Phone: [phone]  Hours of Operation: 1st & 3rd Tuesdays 6:00PM-7:30PM  [facility]  Address: [address] [place] [address]  Phone: [phone]  Hours of Operation: By appointment -- Mondays-Fridays 9:00AM-5:00PM  [facility]  Address: [address] [place] [address]  Phone: [phone]  Hours of Operation: 3rd, 4th, & 5th Thursdays 5:00PM-7:00PM  [facility] John Gallegos  Address: [address] [place] [address]  Phone: [phone]  Hours of Operation: Mondays 10:00AM-12:30PM; Thursdays 10:00AM-12:30PM & 1:00PM-3:30 PM  [facility]  Address: [address] [facility] [address], [place] [address]  Phone: [phone]  Hours of Operation: Tuesdays 5:00PM-6:00PM  [facility]  Address: [address] [place] [address]  Phone: [phone]  Hours of Operation: Thursdays 6:00PM-7:30PM; Closed 5th Thursday  [place] - [address]  [facility]  Address: [address] [place] [address]  Phone: [phone]  Hours of Operation: For last names beginning with A-M: 2nd Tuesday 8:00AM-10:00AM or 6:00PM-7:00PM;  For last names beginning with N-Z: 2nd Wednesday 8:00AM-10:00AM  [place] - [address]  [facility] [facility] (formerly [facility])  Address: [address]  [place] [address]  Phone: [phone]  Hours of Operation: Wednesdays 9:30AM-12:00PM; 1st, 2nd, & 3rd Thursdays 6:00PM-8:00PM; 2nd & 3rd Saturdays 9:30AM-12:00PM  [place] - [id number]  [facility]  Address: [address] [facility], [place] [address]  Phone: [phone]  Hours of Operation: 3rd Saturday 9:00AM-11:00AM    [facility] [place]  Address: [address] [facility], [place] [address]  Phone: [phone]  Hours of Operation: Tuesdays 10:00AM-2:00PM  [place] [facility]  Address: [address] [facility], [place] [address]  Phone: [phone]  Hours of Operation: Tuesdays 10:00AM-12:00PM; Closed 5th Tuesday  [place] - [id number]  [facility]  Address: [address] [place] [address]  Phone: [phone]  Hours of Operation: Mondays 11:00AM-12:30PM & 7:00PM-8:30PM  [facility] [address] [place] [address] [employer]  Address: [address] [facility] [place] [address]  Phone: [phone]  Hours of Operation: Mondays 5:00PM-7:00PM  [address]  Address: [address] [facility] [place] [address]  Phone: [phone]  Hours of Operation: 2nd & 4th Saturdays 9:00AM-10:00AM  [place]  [place]  [facility]  Address: [address] [place] [address]  Phone: [phone]  Hours of Operation: 2nd Saturday - Lunch (Call for times)  [place] [facility]  Address: [address], [place] [address]  Phone: [phone]  Ours of Operation: Sundays 1:00PM-2:00PM  [facility] [place]  Address: [address] [facility], [place] [address]  Phone: [phone]  Hours of Operation: Saturdays 12:00PM-1:00PM  [facility]  Address: [address] [place] [address], [place] [address]  Phone: [phone]  Hours of Operation: Monday-Friday 8:00AM-4:00PM  [place] [employer]  Address: [address] [facility] [place] [address]  Phone: [phone]  Hours of Operation: Monday-Friday 12:00PM-1:00PM    [facility]  Address: [address]  [place] [address]  Phone: [phone]  Hours of Operation: Call for times  [facility]  Address: [address] [facility], [place] [address]  Phone: [phone]  Hours of Operation: Monday-Friday 12:00PM-1:00PMChange on [address] [place] [address]  [address]  [place] [address]  [phone]  Hours  9:00 am to 5:00 pm Monday thru Friday      Abstinence from addiction is only the beginning.  [facility] residents are encouraged to pursue meaningful lives with purpose at the Change on [address] [place] [address]. We provide a safe and sober environment which is staffed by people in recovery, who share similar experiences, and are willing to listen and assist people in early recovery. It takes a coÃmunity to support the recovery process. This [facility] initiative recognizes and supports the efforts and investment of those personally in recovery as well as those supporting their efforts.    Our [place]  The [facility] on [address] [place] [address] offers a safe environment to those seeking recovery and/or who are part of the treatment continuum.    Although we are not a treatment facility, we are able to assist those in need, refer members of the center to community resources as needed. We encourage and guide members to pursue meaningful lives with purpose at the [facility] on [address] [place] [address] .    Our hope is that they will find inspiration, encouragement, support through the examples of our volunteers and staff at the center. It takes effort and a dedicated community to support the recovery process.

## 2024-08-20 NOTE — PLAN OF CARE
Problem: SUBSTANCE USE/ABUSE  Goal: By discharge, will develop insight into their chemical dependency and sustain motivation to continue in recovery  Description: INTERVENTIONS:  - Attends all daily group sessions and scheduled AA groups  - Actively practices coping skills through participation in the therapeutic community and adherence to program rules  - Reviews and completes assignments from individual treatment plan  - Assist patient development of understanding of their personal cycle of addiction and relapse triggers  Outcome: Progressing  Goal: By discharge, patient will have ongoing treatment plan addressing chemical dependency  Description: INTERVENTIONS:  - Assist patient with resources and/or appointments for ongoing recovery based living  Outcome: Progressing     Problem: Knowledge Deficit  Goal: Patient/family/caregiver demonstrates understanding of disease process, treatment plan, medications, and discharge instructions  Description: Complete learning assessment and assess knowledge base.  Interventions:  - Provide teaching at level of understanding  - Provide teaching via preferred learning methods  Outcome: Progressing

## 2024-08-20 NOTE — ASSESSMENT & PLAN NOTE
Mag 1.7 on admission  K 3.5  Repleted with IV magnesium sulfate 2 g  Continue monitoring and replete electrolytes as indicated

## 2024-08-20 NOTE — UTILIZATION REVIEW
NOTIFICATION OF INPATIENT MEDICAL ADMISSION   AUTHORIZATION REQUEST   SERVICING FACILITY:   [facility]  [address] [place] [address]  Tax ID: [id number]  NPI: [id number] ATTENDING PROVIDER:  Attending Name and NPI#: Melo Ramirez Do [[id number]]  Address: [address] [place] [address]  Phone: [phone]     ADMISSION INFORMATION:  Place of Service: Inpatient Acute [facility]  Place of Service Code: 21  Inpatient Admission Date/Time: 8/19/24  5:52 PM  Discharge Date/Time: No discharge date for patient encounter.  Admitting Diagnosis Code/Description:  Alcohol abuse [F10.10]  Benzodiazepine abuse (HCC) [F13.10]  Alcohol use disorder, moderate, dependence (HCC) [F10.20]  Admitted to substance misuse detoxification center [Z78.9]     UTILIZATION REVIEW CONTACT:  Magnolia Caba, Utilization   Network Utilization Review Department  Phone: [phone]  Fax [phone]  Email: [email or web address]  Contact for approvals/pending authorizations, clinical reviews, and discharge.     PHYSICIAN ADVISORY SERVICES:  Medical Necessity Denial & Hfao-lu-Yxjj Review  Phone: [phone]  Fax: [phone]  Email: [email or web address]     DISCHARGE SUPPORT TEAM:  For Patients Discharge Needs & Updates  Phone: [phone] opt. 2 Fax: [phone]  Email: [email or web address]

## 2024-08-20 NOTE — ASSESSMENT & PLAN NOTE
H/o chronic daily alcohol consumption, denies h/o withdrawal seizures  Last drink: 8/19/24   Ethanol lvl: 161 8/19/24 146/99   Continue SEWS protocol with symptom-triggered phenobarbital for medically-assisted alcohol withdrawal  Current symptoms include anxiety  Withdrawal appears well controlled at present   Will likely discontinue protocol this afternoon   Total pheno: 520 mg   Will initiate thiamine, folic acid, and multivitamin supplementation   Continue to monitor vitals, symptoms

## 2024-08-20 NOTE — UTILIZATION REVIEW
Initial Clinical Review    Pt presented to [facility] (formerly [facility]) for its Level IV medically managed intensive inpatient detox unit.    Admission: Date/Time/Statement:   Admission Orders (From admission, onward)       Ordered        08/19/24 1752  INPATIENT ADMISSION  Once                          Orders Placed This Encounter   Procedures    INPATIENT ADMISSION     Standing Status:   Standing     Number of Occurrences:   1     Order Specific Question:   Level of Care     Answer:   Med Surg [16]     Order Specific Question:   Estimated length of stay     Answer:   More than 2 Midnights     Order Specific Question:   Certification     Answer:   I certify that inpatient services are medically necessary for this patient for a duration of greater than two midnights. See H&P and MD Progress Notes for additional information about the patient's course of treatment.     ED Arrival Information       Expected   -    Arrival   8/19/2024 15:49    Acuity   Emergent              Means of arrival   Walk-In    Escorted by   Self    Service   Emergency Medicine    Admission type   Emergency              Arrival complaint   detox             Chief Complaint   Patient presents with    Detox Evaluation     Requesting detox from Alcohol and benozs - reports half bottle of vodka /day (last drink 20 min pta) - denies hx of seizures - Klonopin 1mg twice a day - reports nausea and stomach pain at this time        Initial Presentation: 39 y.o. female who presented to medical detox. Inpatient admission for evaluation and treatment of alcohol withdrawal syndrome. Presented w/ need for detox from alcohol & benzodiazepines. Serum ETOH: 161. Reports 375 mL liquor daily, last drink on 8/19. Also uses Klonopin 1 mg BID. Has prior rehab treatment for withdrawal. Reports no hx of withdrawal seizures. On exam, anxiety, diaphoresis, tremors. SEWS 6. Plan: SEWS monitoring w/ phenobarbital management, IV thiamine/folic acid supplement, IVF, telemetry,  continuous pulse ox, continue PTA meds, trend labs, replete electrolytes as needed. Check iron panel.     Anticipated Length of Stay:  Patient will be admitted on an Inpatient basis with an anticipated length of stay of  >2  midnights.   This patient qualifies for Level IV medically managed intensive inpatient services under the criteria set by the American Society of Addiction Medicine, including dimensions 1-3. The patient is in withdrawal (or is intoxicated with high risk of withdrawal), with severe and unstable medical and/or psychiatric (dual diagnosis) problems, requiring requires 24-hour medical and nursing care and the full resources of a licensed hospital.      Date: 08/20/24       Day 2: Pt reports no current withdrawal symptoms. On exam, unremarkable. SEWS 0. Plan: continue SEWS monitoring w/ phenobarbital management, IV thiamine/folic acid supplement, telemetry, continuous pulse ox, continue current meds, trend labs, replete electrolytes as needed. Pt interested in IP DAVID treatment.     ED Triage Vitals   Temperature Pulse Respirations Blood Pressure SpO2 Pain Score   08/19/24 1620 08/19/24 1618 08/19/24 1618 08/19/24 1618 08/19/24 1618 08/19/24 2000   97.7 °F (36.5 °C) 104 18 146/99 99 % No Pain     Weight (last 2 days)       Date/Time Weight    08/19/24 2118 62.1 (136.91)    08/19/24 2000 62.1 (137)    08/19/24 1618 62.2 (137.1)            Vital Signs (last 3 days)       Date/Time Temp Pulse Resp BP MAP (mmHg) SpO2 O2 Device Patient Position - Orthostatic VS CIWA-Ar Total SEWS Total Score Pain    08/20/24 0812 -- -- -- -- -- -- -- -- -- 0 --    08/20/24 0705 98 °F (36.7 °C) 66 18 108/63 78 99 % None (Room air) Lying -- -- --    08/20/24 0445 -- -- -- -- -- -- -- -- -- 0 --    08/20/24 0435 97.6 °F (36.4 °C) 81 18 133/90 -- 99 % None (Room air) Lying -- -- --    08/20/24 0145 -- -- -- -- -- -- -- -- -- 0 --    08/20/24 0105 -- -- -- -- -- -- -- -- -- 9 --    08/20/24 0041 98.2 °F (36.8 °C) 80 16 113/77  89 98 % None (Room air) Lying -- -- --    08/20/24 0040 -- -- -- -- -- -- -- -- -- 0 --    08/19/24 2333 -- -- -- -- -- -- -- -- -- 8 --    08/19/24 2331 97.9 °F (36.6 °C) 104 16 148/87 107 97 % None (Room air) Lying -- -- --    08/19/24 2130 -- -- -- -- -- -- -- -- -- 0 --    08/19/24 2118 98.4 °F (36.9 °C) 92 16 112/69 83 99 % None (Room air) Lying -- -- --    08/19/24 2030 -- -- -- -- -- 99 % None (Room air) -- -- -- --    08/19/24 2002 -- -- -- -- -- 99 % None (Room air) -- -- -- --    08/19/24 2000 98.4 °F (36.9 °C) -- -- -- -- -- -- -- -- -- No Pain    08/19/24 1935 -- -- -- -- -- -- -- -- -- 6 --    08/19/24 1916 97.7 °F (36.5 °C) 88 16 119/78 91 94 % None (Room air) Lying -- -- --    08/19/24 1700 -- 104 -- 146/99 -- -- -- -- 1 -- --    08/19/24 1620 97.7 °F (36.5 °C) -- -- -- -- -- -- -- -- -- --    08/19/24 1618 -- 104 18 146/99 -- 99 % None (Room air) Sitting -- -- --             CIWA-Ar Score       Row Name 08/19/24 1700             CIWA-Ar    /99      Pulse 104      Nausea and Vomiting 0      Tactile Disturbances 0      Tremor 0      Auditory Disturbances 0      Paroxysmal Sweats 0      Visual Disturbances 0      Anxiety 1      Headache, Fullness in Head 0      Agitation 0      Orientation and Clouding of Sensorium 0      CIWA-Ar Total 1                      Severity of Ethanol Withdrawal Scale (SEWS):   Row Name 08/20/24 0812 08/20/24 0445 08/20/24 0145 08/20/24 0105 08/20/24 0040   Severity of Ethanol Withdrawal Scale (SEWS)   ANXIETY: Do you feel that something bad is about to happen to you right now? 0  -AP 0  -JA 0  -JA 3  -JA 0  -JA   NAUSEA and DRY HEAVES or VOMITING? 0  -AP 0  -JA 0  -JA 0  -JA 0  -JA   SWEATING: (includes moist palms, sweating now)? Score 0 or 2 0  -AP 0  -JA 0  -JA 0  -JA 0  -JA   TREMOR: with arms extended eyes closed? 0  -AP 0  -JA 0  -JA 2  -JA 0  -JA   AGITATION: Fidgety, restless, pacing? 0  -AP 0  -JA 0  -JA 3  -JA 0  -JA   DISORIENTATION: 0  -AP 0  -JA 0  -JA 0   -JA 0  -JA   HALLUCINATIONS: 0  -AP 0  -JA 0  -JA 1  -JA 0  -JA   VITAL SIGNS: ANY (Pulse >110, Diastolic BP >90, Temp >99.6) 0  -AP 0  -JA 0  -JA 0  -JA 0  -JA   SEWS Total Score 0  -AP 0  -JA 0  -JA 9  -JA 0  -JA   Alfredo Agitation Sedation Scale (RASS)   Alfredo Agitation Sedation Scale (RASS) 0  -AP 0  -JA -1  -JA +1  -JA -1  -JA   Row Name 08/19/24 2333 08/19/24 2130 08/19/24 1935     Severity of Ethanol Withdrawal Scale (SEWS)   ANXIETY: Do you feel that something bad is about to happen to you right now? 3  -JA 0  -JA 3  -JA     NAUSEA and DRY HEAVES or VOMITING? 0  -JA 0  -JA 3  -JA     SWEATING: (includes moist palms, sweating now)? Score 0 or 2 0  -JA 0  -JA 0  -JA     TREMOR: with arms extended eyes closed? 2  -JA 0  -JA 0  -JA     AGITATION: Fidgety, restless, pacing? 3  -JA 0  -JA 0  -JA     DISORIENTATION: 0  -JA 0  -JA 0  -JA     HALLUCINATIONS: 0  -JA 0  -JA 0  -JA     VITAL SIGNS: ANY (Pulse >110, Diastolic BP >90, Temp >99.6) 0  -JA 0  -JA 0  -JA     SEWS Total Score 8  -JA 0  -JA 6  -JA         Pertinent Labs/Diagnostic Test Results:   Cardiology:  ECG 12 lead   Final Result by Guerrero Martinez MD (08/20 0718)   Sinus tachycardia   Nonspecific ST abnormality   Abnormal ECG   When compared with ECG of 06-OCT-2010 06:05,   No significant change was found   Confirmed by Guerrero Martinez ([id number]) on 8/20/2024 7:18:15 AM            Results from last 7 days   Lab Units 08/20/24  0522 08/19/24  1704   WBC Thousand/uL 4.83 4.21*   HEMOGLOBIN g/dL 11.8 11.7   HEMATOCRIT % 33.0* 34.2*   PLATELETS Thousands/uL 184 211   TOTAL NEUT ABS Thousands/µL  --  1.96         Results from last 7 days   Lab Units 08/20/24  0522 08/19/24  1704   SODIUM mmol/L 136 136   POTASSIUM mmol/L 3.5 3.7   CHLORIDE mmol/L 104 104   CO2 mmol/L 22 22   ANION GAP mmol/L 10 10   BUN mg/dL 7 10   CREATININE mg/dL 0.58* 0.70   EGFR ml/min/1.73sq m 115 108   CALCIUM mg/dL 7.7* 8.7   MAGNESIUM mg/dL 1.7* 1.9     Results from last 7 days   Lab  Units 08/20/24  0522 08/19/24  1704   AST U/L 31 41*   ALT U/L 18 19   ALK PHOS U/L 47 53   TOTAL PROTEIN g/dL 6.5 7.2   ALBUMIN g/dL 3.9 4.3   TOTAL BILIRUBIN mg/dL 0.82 0.59         Results from last 7 days   Lab Units 08/20/24  0522 08/19/24  1704   GLUCOSE RANDOM mg/dL 90 119     Results from last 7 days   Lab Units 08/19/24  1704   LIPASE u/L 37     Results from last 7 days   Lab Units 08/19/24  1730   AMPH/METH  Negative   BARBITURATE UR  Negative   BENZODIAZEPINE UR  Negative   COCAINE UR  Negative   METHADONE URINE  Negative   OPIATE UR  Negative   PCP UR  Negative   THC UR  Negative     Results from last 7 days   Lab Units 08/19/24  1704   ETHANOL LVL mg/dL 161*         ED Treatment-Medication Administration from 08/19/2024 1548 to 08/19/2024 1833         Date/Time Order Dose Route Action     08/19/2024 1709 sodium chloride 0.9 % bolus 1,000 mL 1,000 mL Intravenous New Bag     08/19/2024 1730 thiamine tablet 100 mg 100 mg Oral Given          Past Medical History:   Diagnosis Date    Anxiety     Migraine      Present on Admission:  **None**      Admitting Diagnosis: Alcohol abuse [F10.10]  Benzodiazepine abuse (HCC) [F13.10]  Alcohol use disorder, moderate, dependence (HCC) [F10.20]  Admitted to substance misuse detoxification center [Z78.9]  Age/Sex: 39 y.o. female  Admission Orders:  Regular Diet.   I&O. SCDs.  Fall & Seizure Precautions.  SEWS monitoring.  Telemetry & Continuous Pulse Ox.    Scheduled Medications:  enoxaparin, 40 mg, Subcutaneous, Daily  escitalopram, 20 mg, Oral, Daily  folic acid 1 mg, thiamine (VITAMIN B1) 100 mg in sodium chloride 0.9 % 100 mL IV piggyback, , Intravenous, Daily  gabapentin, 300 mg, Oral, HS  magnesium sulfate, 2 g, Intravenous, Once  multivitamin-minerals, 1 tablet, Oral, Daily    Continuous IV Infusions:  sodium chloride, 100 mL/hr, Intravenous, Continuous    PRN Meds:  acetaminophen, 650 mg, Oral, Q6H PRN  acetaminophen, 650 mg, Oral, Q6H PRN  ondansetron, 4 mg,  Intravenous, Q6H PRN  phenobarbital, 260 mg, Intravenous; 8/19 x1  phenobarbital, 130 mg, Intravenous; 8/19 x1, 8/20 x1        CONSULT TO CERTIFIED   IP CONSULT TO CASE MANAGEMENT    Network Utilization Review Department  ATTENTION: Please call with any questions or concerns to [phone] and carefully listen to the prompts so that you are directed to the right person. All voicemails are confidential.   For Discharge needs, contact Care Management DC Support Team at [phone] opt. 2  Send all requests for admission clinical reviews, approved or denied determinations and any other requests to dedicated fax number below belonging to the campus where the patient is receiving treatment. List of dedicated fax numbers for the Facilities:  FACILITY NAME UR FAX NUMBER   ADMISSION DENIALS (Administrative/Medical Necessity) [phone]   DISCHARGE SUPPORT TEAM (NETWORK) [phone]   PARENT CHILD HEALTH (Maternity/NICU/Pediatrics) [phone]   [facility] [phone]   [facility] [phone]   [facility] [phone]   [facility] [phone]   [facility] [phone]   [facility] [phone]   [facility] [phone]   [facility] [phone]   [facility] [phone]   [facility] [phone]   [facility] [phone]   [facility] [phone]

## 2024-08-21 VITALS
BODY MASS INDEX: 22 KG/M2 | WEIGHT: 136.91 LBS | SYSTOLIC BLOOD PRESSURE: 127 MMHG | OXYGEN SATURATION: 99 % | HEIGHT: 66 IN | DIASTOLIC BLOOD PRESSURE: 87 MMHG | HEART RATE: 77 BPM | RESPIRATION RATE: 16 BRPM | TEMPERATURE: 97.5 F

## 2024-08-21 PROBLEM — E83.42 HYPOMAGNESEMIA: Status: RESOLVED | Noted: 2024-08-20 | Resolved: 2024-08-21

## 2024-08-21 PROBLEM — F10.939 ALCOHOL WITHDRAWAL (HCC): Status: RESOLVED | Noted: 2024-08-19 | Resolved: 2024-08-21

## 2024-08-21 LAB
ALBUMIN SERPL BCG-MCNC: 4 G/DL (ref 3.5–5)
ALP SERPL-CCNC: 57 U/L (ref 34–104)
ALT SERPL W P-5'-P-CCNC: 19 U/L (ref 7–52)
ANION GAP SERPL CALCULATED.3IONS-SCNC: 9 MMOL/L (ref 4–13)
AST SERPL W P-5'-P-CCNC: 39 U/L (ref 13–39)
BILIRUB SERPL-MCNC: 0.93 MG/DL (ref 0.2–1)
BUN SERPL-MCNC: 5 MG/DL (ref 5–25)
CALCIUM SERPL-MCNC: 8.2 MG/DL (ref 8.4–10.2)
CHLORIDE SERPL-SCNC: 105 MMOL/L (ref 96–108)
CO2 SERPL-SCNC: 23 MMOL/L (ref 21–32)
CREAT SERPL-MCNC: 0.53 MG/DL (ref 0.6–1.3)
GFR SERPL CREATININE-BSD FRML MDRD: 119 ML/MIN/1.73SQ M
GLUCOSE SERPL-MCNC: 75 MG/DL (ref 65–140)
MAGNESIUM SERPL-MCNC: 2.4 MG/DL (ref 1.9–2.7)
POTASSIUM SERPL-SCNC: 3.6 MMOL/L (ref 3.5–5.3)
PROT SERPL-MCNC: 6.6 G/DL (ref 6.4–8.4)
SODIUM SERPL-SCNC: 137 MMOL/L (ref 135–147)

## 2024-08-21 PROCEDURE — 80053 COMPREHEN METABOLIC PANEL: CPT

## 2024-08-21 PROCEDURE — 83735 ASSAY OF MAGNESIUM: CPT

## 2024-08-21 RX ORDER — ESCITALOPRAM OXALATE 20 MG/1
20 TABLET ORAL DAILY
Qty: 14 TABLET | Refills: 0 | Status: SHIPPED | OUTPATIENT
Start: 2024-08-21 | End: 2024-09-04

## 2024-08-21 RX ORDER — GABAPENTIN 300 MG/1
300 CAPSULE ORAL
Qty: 14 CAPSULE | Refills: 0 | Status: SHIPPED | OUTPATIENT
Start: 2024-08-21 | End: 2024-09-04

## 2024-08-21 RX ORDER — CLONAZEPAM 1 MG/1
1 TABLET ORAL 2 TIMES DAILY
Qty: 28 TABLET | Refills: 0 | Status: SHIPPED | OUTPATIENT
Start: 2024-08-21 | End: 2024-09-04

## 2024-08-21 RX ORDER — PRAZOSIN HYDROCHLORIDE 1 MG/1
1 CAPSULE ORAL
Qty: 14 CAPSULE | Refills: 0 | Status: SHIPPED | OUTPATIENT
Start: 2024-08-21 | End: 2024-09-04

## 2024-08-21 RX ADMIN — MULTIPLE VITAMINS W/ MINERALS TAB 1 TABLET: TAB ORAL at 08:56

## 2024-08-21 RX ADMIN — CLONAZEPAM 1 MG: 1 TABLET ORAL at 08:56

## 2024-08-21 RX ADMIN — ENOXAPARIN SODIUM 40 MG: 40 INJECTION SUBCUTANEOUS at 08:56

## 2024-08-21 RX ADMIN — FOLIC ACID 1 MG: 1 TABLET ORAL at 09:01

## 2024-08-21 RX ADMIN — THIAMINE HCL TAB 100 MG 100 MG: 100 TAB at 08:56

## 2024-08-21 RX ADMIN — GABAPENTIN 300 MG: 300 CAPSULE ORAL at 16:19

## 2024-08-21 RX ADMIN — ESCITALOPRAM OXALATE 20 MG: 10 TABLET, FILM COATED ORAL at 08:56

## 2024-08-21 RX ADMIN — GABAPENTIN 300 MG: 300 CAPSULE ORAL at 08:56

## 2024-08-21 NOTE — ASSESSMENT & PLAN NOTE
Patient with a h/o chronic benzodiazepine use  Using Klonopin 2mg daily   Denies H/o benzo withdrawal seizures  Withdrawal management under SEWS protocol as above as patient is no longer interested in continuing   Recommend f/u with OP Psychiatry for continued psychiatric care  Consult case management/CRS for assistance with aftercare resources - pt interested in inpatient drug and alcohol after discharge  Patient is now off SEWS however continues to request restarting her home dose of Klonopin 2mg BID - orders placed

## 2024-08-21 NOTE — ASSESSMENT & PLAN NOTE
H/o chronic daily alcohol consumption, denies h/o withdrawal seizures  Last drink: 8/19/24   Ethanol lvl: 161 8/19/24 146/99   Continue SEWS protocol with symptom-triggered phenobarbital for medically-assisted alcohol withdrawal  Current symptoms include anxiety  Withdrawal appears well controlled at present   Protocol was discontinued on 8/20 as withdrawal symptoms have resolved  Total pheno: 520 mg   Will initiate thiamine, folic acid, and multivitamin supplementation   Continue to monitor vitals, symptoms

## 2024-08-21 NOTE — CERTIFIED RECOVERY SPECIALIST
Certified  Note    Patient name: Mckenna Brown  Location: 5T DETOX 510/5T DETOX 51*  [place]: [facility]  Attending:  Melo Ramirez DO MRN [id number]  : 1985  Age: 39 y.o.    Sex: female Date 2024         Substance Use History:     Social History     Substance and Sexual Activity   Alcohol Use Yes    Comment: 1/2 bottle vodka/day        Social History     Substance and Sexual Activity   Drug Use Yes    Types: Marijuana, Benzodiazepines      Time spent with Patient      CRS engaged with patient to check in and offer support is desired.      Patient reports she is doing ok and nothing needed at this time, not receptive to conversation and asked if CRS would come back at another time.     CRS will continue to follow.      Resources provided.         Pam Disla

## 2024-08-21 NOTE — ASSESSMENT & PLAN NOTE
Pt with a h/o chronic heavy alcohol use   Drinks 1/5th of liquor daily  Previous admission to the [facility] Medical Detox Unit   Denies H/o withdrawal seizures  Contemplating naltrexone at this time  Withdrawal management as above  Initiate IVFs, IV thiamine, folic acid, and MVI  Consult case management/CRS for assistance with aftercare resources - pt interested in inpatient drug and alcohol rehab at this time

## 2024-08-21 NOTE — DISCHARGE SUMMARY
MEDICAL DETOX UNIT, LEVEL 4  Department of Medical Toxicology  Reason for Admission/Principal Problem: Alcohol use disorder, alcohol withdrawal syndrome   Admitting provider: JUS Belle DO   8/19/2024  4:21 PM       Discharging Physician / Practitioner: Lorraine Sepulveda PA-C  PCP: Narinder Armijo DO  Admission Date:   Admission Orders (From admission, onward)       Ordered        08/19/24 1752  INPATIENT ADMISSION  Once                          Discharge Date: 8/21/2024    Medical Problems       Resolved Problems  Date Reviewed: 8/20/2024            Resolved    * (Principal) Alcohol withdrawal (HCC) 8/21/2024     Resolved by  Lorraine Sepulveda PA-C          Alcohol use disorder  Assessment & Plan  Pt with a h/o chronic heavy alcohol use   Drinks 1/5th of liquor daily  Previous admission to the [facility] Medical Detox Unit   Denies H/o withdrawal seizures  Contemplating naltrexone at this time  Withdrawal management as above  Initiate IVFs, IV thiamine, folic acid, and MVI  Consult case management/CRS for assistance with aftercare resources - pt interested in inpatient drug and alcohol rehab at this time     * Alcohol withdrawal (HCC)-resolved as of 8/21/2024  Assessment & Plan  H/o chronic daily alcohol consumption, denies h/o withdrawal seizures  Last drink: 8/19/24   Ethanol lvl: 161 8/19/24 146/99   Continue SEWS protocol with symptom-triggered phenobarbital for medically-assisted alcohol withdrawal  Current symptoms include anxiety  Withdrawal appears well controlled at present   Protocol was discontinued on 8/20 as withdrawal symptoms have resolved  Total pheno: 520 mg   Will initiate thiamine, folic acid, and multivitamin supplementation   Continue to monitor vitals, symptoms      Hypomagnesemia  Assessment & Plan  Mag 1.7 on admission  K 3.5  Repleted with IV magnesium sulfate 2 g  Continue monitoring and replete electrolytes as indicated     Iron deficiency  anemia  Assessment & Plan  History of CHENG   Iron panel pending  Continue to monitor and initiate ferrous sulfate if needed     Anxiety and depression  Assessment & Plan  Patient endorses a h/o chronic anxiety and depression  Home medications include: Lexapro 20 mg, Klonpin 1mg BID   Denies SI/HI/thoughts of self harm  Continue home medications  Recommend OP f/u with PCP/OP Psychiatry     Benzodiazepine dependence (HCC)  Assessment & Plan  Patient with a h/o chronic benzodiazepine use  Using Klonopin 2mg daily   Denies H/o benzo withdrawal seizures  Withdrawal management under SEWS protocol as above as patient is no longer interested in continuing   Recommend f/u with OP Psychiatry for continued psychiatric care  Consult case management/CRS for assistance with aftercare resources - pt interested in inpatient drug and alcohol after discharge  Patient is now off SEWS however continues to request restarting her home dose of Klonopin 2mg BID - orders placed         Consultations During Hospital Stay:  Case management  Certified      Procedures Performed:   None    Significant Findings / Test Results:   Hypomagnesemia - repleted    Incidental Findings:   N/A     Test Results Pending at Discharge (will require follow up):   None     Outpatient Tests Requested:  Follow up with your PCP within one week after discharge     Complications:  None    Reason for Admission: Alcohol use disorder, alcohol withdrawal syndrome    Hospital Course:     Mckenna Brown is a 39 y.o. female patient who originally presented to the hospital on 8/19/2024 due to alcohol withdrawal seeking detoxification. Patient initially presented to the [facility] ED requesting alcohol and benzodiazepine detoxification. She admits to drinking 1/2 bottle of vodka daily and also takes 2mg of Klonopin twice daily (prescribed). She was then admitted to [facility] inpatient detox unit where she was initiated on SEWS protocol with phenobarbital for alcohol  "withdrawal symptoms. She received a total of 520mg of phenobarbital during her admission. SEWS was discontinued on 8/20 as her withdrawal symptoms have resolved. She requested to restart her home Klonopin on 8/20 and orders were placed. Patient did not want to start Naltrexone as she had adverse reaction in the past. She was found to have minor electrolyte abnormalities during admission which were repleted. Patient remained stable for discharge to home where she will picked up by [facility] inpatient rehab on 8/21/2024           Please see above list of diagnoses and related plan for additional information.     Condition at Discharge: good     Discharge Day Visit / Exam:     Subjective:  Pt has no complaints at this time  Vitals: Blood Pressure: 124/76 (08/20/24 1928)  Pulse: 73 (08/20/24 1928)  Temperature: (!) 96.6 °F (35.9 °C) (08/20/24 1928)  Temp Source: Temporal (08/20/24 1928)  Respirations: 16 (08/20/24 1928)  Height: 5' 6\" (167.6 cm) (08/19/24 2118)  Weight - Scale: 62.1 kg (136 lb 14.5 oz) (08/19/24 2118)  SpO2: 97 % (08/20/24 1928)  Exam:   Physical Exam  Vitals reviewed.   Constitutional:       Appearance: Normal appearance.   HENT:      Head: Normocephalic and atraumatic.      Nose: Nose normal.      Mouth/Throat:      Mouth: Mucous membranes are moist.   Eyes:      Extraocular Movements: Extraocular movements intact.      Conjunctiva/sclera: Conjunctivae normal.      Pupils: Pupils are equal, round, and reactive to light.   Cardiovascular:      Rate and Rhythm: Normal rate.   Pulmonary:      Effort: Pulmonary effort is normal.   Abdominal:      General: Abdomen is flat.      Palpations: Abdomen is soft.   Musculoskeletal:         General: Normal range of motion.      Cervical back: Normal range of motion.   Skin:     General: Skin is warm and dry.      Capillary Refill: Capillary refill takes less than 2 seconds.   Neurological:      General: No focal deficit present.      Mental Status: She is alert and " oriented to person, place, and time.   Psychiatric:         Mood and Affect: Mood normal.         Behavior: Behavior normal.         Thought Content: Thought content normal.         Judgment: Judgment normal.       Discussion with Family: I personally did not discuss this case with the patient's family.  I reviewed the discharge plan with the patient and answered all questions to the best of my ability.      Discharge instructions/Information to patient and family:   See after visit summary for information provided to patient and family.      Provisions for Follow-Up Care:  See after visit summary for information related to follow-up care and any pertinent home health orders.      Disposition:     Home    For Discharges to [facility]:   Not Applicable to this Patient - Not Applicable to this Patient    Planned Readmission: no     Discharge Statement:  I spent 35 minutes discharging the patient. This time was spent on the day of discharge. I had direct contact with the patient on the day of discharge. Greater than 50% of the total time was spent examining patient, answering all patient questions, arranging and discussing plan of care with patient as well as directly providing post-discharge instructions.  Additional time then spent on discharge activities.    Discharge Medications:  See after visit summary for reconciled discharge medications provided to patient and family.      ** Please Note: This note has been constructed using a voice recognition system **

## 2024-08-22 NOTE — UTILIZATION REVIEW
NOTIFICATION OF ADMISSION DISCHARGE   This is a Notification of Discharge from [facility]. Please be advised that this patient has been discharge from our facility. Below you will find the admission and discharge date and time including the patient’s disposition.   UTILIZATION REVIEW CONTACT:  Magnolia Caba MA  Utilization   Network Utilization Review Department  Phone: [phone] x carefully listen to the prompts. All voicemails are confidential.  Email: [email or web address]     ADMISSION INFORMATION  PRESENTATION DATE: 8/19/2024  4:21 PM  OBERVATION ADMISSION DATE: N/A  INPATIENT ADMISSION DATE: 8/19/24  5:52 PM   DISCHARGE DATE: 8/21/2024  4:55 PM   DISPOSITION:Home/Self Care    Network Utilization Review Department  ATTENTION: Please call with any questions or concerns to [phone] and carefully listen to the prompts so that you are directed to the right person. All voicemails are confidential.   For Discharge needs, contact Care Management DC Support Team at [phone] opt. 2  Send all requests for admission clinical reviews, approved or denied determinations and any other requests to dedicated fax number below belonging to the campus where the patient is receiving treatment. List of dedicated fax numbers for the Facilities:  FACILITY NAME UR FAX NUMBER   ADMISSION DENIALS (Administrative/Medical Necessity) [phone]   DISCHARGE SUPPORT TEAM ([facility]) [phone]   PARENT CHILD HEALTH (Maternity/NICU/Pediatrics) [phone]   [facility] [phone]   [facility] [phone]   [facility] [phone]   [facility] [phone]   [facility] [phone]   [facility] [phone]   [facility] [phone]   [facility]  [phone]   [facility] [phone]   [facility] [phone]   [facility] [phone]   [facility] [phone]
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
## 2024-08-23 NOTE — PROGRESS NOTES
08/23/24 1400   Readmission Root Cause   30 Day Readmission No   Patient Information   Admitted from: Home   Mental Status Alert   Assessment information provided by: Patient   Primary Caregiver Self   Support Systems Friend;Family members   County of Residence [place]   Type of Current Residence Other (Comment)   Living Arrangements Lives Alone   Activities of Daily Living Prior to Admission   Functional Status Independent   Patient Information Continued   Income Source Employed   Means of Transportation   Means of Transport to Appts: None  (Car currently not working.)   Referral Data   Referral Reason Drug/Alcohol Abuse

## 2024-08-23 NOTE — PROGRESS NOTES
08/23/24 1352   Substance Abuse Addendum Details   History of Withdrawal Symptoms Seizures   Medical Complications Migraines   Sober Supports sister and friends in recovery group.   Present Treatment n/a   Substance Abuse Treatment Hx Attends AA/NA;Past Tx, Inpatient;Past Tx, Outpatient   ASAM Level & Criteria 4.0 Patient has a history of withdrawals and tremors. Lack of community support and resources. Struggles  staying clean.   Stage of Change   Stage of Change Contemplation

## 2024-08-23 NOTE — PROGRESS NOTES
08/23/24 0950   Other Referral/Resources/Interventions Provided:   Financial Resources Provided Indigent Transportation  (Pt will be provided with transportation to tx by [facility]. She will be picked up from home.)   Referrals Provided: Crisis Hotline;Food Bank;Other (Specify);Transportation to treatment;Support Group  (IP and OP DAVID tx resources)   Discharge Communications   Discharge planning discussed with: pt   Freedom of Choice Yes   Transportation at Discharge? Yes  (Pt's friend provided her transportation home)   Transport at Discharge  Automobile   Dispatcher Contacted No   Transported by (Company and Unit #) pt's friend   ETA of Transport 5 pm   Accompanied by Friend   CM Handoff Comments Pt was referred to [facility] for IP DAVID tx . However, she stated she needed to go home first to gather belongings and take a shower.     CM was made aware by medical provider that pt was medically clear for discharge.Pt to discharge same day, 8/21/24. Pt denies any withdrawal symptoms at this time. Pt to discharge to home and friend will  upon discharge. Pt was referred to [facility] for IP DAVID tx, but was adamant about going home first to take a shower and gather belongings. A 2 week supply of medications were sent directly to [facility] (the pharmacy used by the facility). This was done for pt safety.     Discharge Address: [address] [place] [address] (Pt's address)  Phone Number: [phone] (Pt phone)    Facility Address and Phone: [facility], [facility] [place] [address], Ph: [phone]

## 2024-12-09 ENCOUNTER — HOSPITAL ENCOUNTER (INPATIENT)
Facility: HOSPITAL | Age: 39
LOS: 2 days | Discharge: HOME | DRG: 641 | End: 2024-12-11
Attending: EMERGENCY MEDICINE | Admitting: INTERNAL MEDICINE
Payer: COMMERCIAL

## 2024-12-09 DIAGNOSIS — F10.10 ALCOHOL ABUSE: Primary | ICD-10-CM

## 2024-12-09 DIAGNOSIS — F10.939 ALCOHOL WITHDRAWAL SYNDROME WITH COMPLICATION (HCC): ICD-10-CM

## 2024-12-09 DIAGNOSIS — E87.6 ACUTE HYPOKALEMIA: ICD-10-CM

## 2024-12-09 DIAGNOSIS — F10.20 ALCOHOL USE DISORDER, SEVERE, DEPENDENCE (HCC): ICD-10-CM

## 2024-12-09 PROBLEM — E83.39 HYPOPHOSPHATEMIA: Status: ACTIVE | Noted: 2024-12-09

## 2024-12-09 LAB
ALBUMIN SERPL BCG-MCNC: 4.7 G/DL (ref 3.5–5)
ALP SERPL-CCNC: 52 U/L (ref 34–104)
ALT SERPL W P-5'-P-CCNC: 12 U/L (ref 7–52)
AMPHETAMINES SERPL QL SCN: NEGATIVE
ANION GAP SERPL CALCULATED.3IONS-SCNC: 10 MMOL/L (ref 4–13)
AST SERPL W P-5'-P-CCNC: 28 U/L (ref 13–39)
ATRIAL RATE: 84 BPM
BARBITURATES UR QL: NEGATIVE
BASOPHILS # BLD AUTO: 0.05 THOUSANDS/ÂΜL (ref 0–0.1)
BASOPHILS NFR BLD AUTO: 1 % (ref 0–1)
BENZODIAZ UR QL: NEGATIVE
BILIRUB SERPL-MCNC: 0.42 MG/DL (ref 0.2–1)
BUN SERPL-MCNC: 3 MG/DL (ref 5–25)
CALCIUM SERPL-MCNC: 8.4 MG/DL (ref 8.4–10.2)
CHLORIDE SERPL-SCNC: 104 MMOL/L (ref 96–108)
CO2 SERPL-SCNC: 26 MMOL/L (ref 21–32)
COCAINE UR QL: NEGATIVE
CREAT SERPL-MCNC: 0.58 MG/DL (ref 0.6–1.3)
EOSINOPHIL # BLD AUTO: 0.17 THOUSAND/ÂΜL (ref 0–0.61)
EOSINOPHIL NFR BLD AUTO: 3 % (ref 0–6)
ERYTHROCYTE [DISTWIDTH] IN BLOOD BY AUTOMATED COUNT: 14.8 % (ref 11.6–15.1)
ETHANOL SERPL-MCNC: 237 MG/DL
EXT PREGNANCY TEST URINE: NEGATIVE
EXT. CONTROL: NORMAL
FENTANYL UR QL SCN: NEGATIVE
GFR SERPL CREATININE-BSD FRML MDRD: 115 ML/MIN/1.73SQ M
GLUCOSE SERPL-MCNC: 117 MG/DL (ref 65–140)
HCT VFR BLD AUTO: 38.1 % (ref 34.8–46.1)
HGB BLD-MCNC: 12.9 G/DL (ref 11.5–15.4)
HYDROCODONE UR QL SCN: NEGATIVE
IMM GRANULOCYTES # BLD AUTO: 0.01 THOUSAND/UL (ref 0–0.2)
IMM GRANULOCYTES NFR BLD AUTO: 0 % (ref 0–2)
LIPASE SERPL-CCNC: 27 U/L (ref 11–82)
LYMPHOCYTES # BLD AUTO: 2.6 THOUSANDS/ÂΜL (ref 0.6–4.47)
LYMPHOCYTES NFR BLD AUTO: 42 % (ref 14–44)
MAGNESIUM SERPL-MCNC: 2.6 MG/DL (ref 1.9–2.7)
MCH RBC QN AUTO: 31.9 PG (ref 26.8–34.3)
MCHC RBC AUTO-ENTMCNC: 33.9 G/DL (ref 31.4–37.4)
MCV RBC AUTO: 94 FL (ref 82–98)
METHADONE UR QL: NEGATIVE
MONOCYTES # BLD AUTO: 0.55 THOUSAND/ÂΜL (ref 0.17–1.22)
MONOCYTES NFR BLD AUTO: 9 % (ref 4–12)
NEUTROPHILS # BLD AUTO: 2.75 THOUSANDS/ÂΜL (ref 1.85–7.62)
NEUTS SEG NFR BLD AUTO: 45 % (ref 43–75)
NRBC BLD AUTO-RTO: 0 /100 WBCS
OPIATES UR QL SCN: NEGATIVE
OXYCODONE+OXYMORPHONE UR QL SCN: NEGATIVE
P AXIS: 53 DEGREES
PCP UR QL: NEGATIVE
PHOSPHATE SERPL-MCNC: 2.1 MG/DL (ref 2.7–4.5)
PLATELET # BLD AUTO: 177 THOUSANDS/UL (ref 149–390)
PMV BLD AUTO: 8.1 FL (ref 8.9–12.7)
POTASSIUM SERPL-SCNC: 3.2 MMOL/L (ref 3.5–5.3)
PR INTERVAL: 158 MS
PROT SERPL-MCNC: 8.2 G/DL (ref 6.4–8.4)
QRS AXIS: 6 DEGREES
QRSD INTERVAL: 74 MS
QT INTERVAL: 398 MS
QTC INTERVAL: 471 MS
RBC # BLD AUTO: 4.04 MILLION/UL (ref 3.81–5.12)
SODIUM SERPL-SCNC: 140 MMOL/L (ref 135–147)
T WAVE AXIS: 18 DEGREES
THC UR QL: NEGATIVE
VENTRICULAR RATE: 84 BPM
WBC # BLD AUTO: 6.13 THOUSAND/UL (ref 4.31–10.16)

## 2024-12-09 PROCEDURE — 81025 URINE PREGNANCY TEST: CPT | Performed by: EMERGENCY MEDICINE

## 2024-12-09 PROCEDURE — 93010 ELECTROCARDIOGRAM REPORT: CPT

## 2024-12-09 PROCEDURE — 99284 EMERGENCY DEPT VISIT MOD MDM: CPT

## 2024-12-09 PROCEDURE — 80307 DRUG TEST PRSMV CHEM ANLYZR: CPT | Performed by: EMERGENCY MEDICINE

## 2024-12-09 PROCEDURE — 80053 COMPREHEN METABOLIC PANEL: CPT | Performed by: EMERGENCY MEDICINE

## 2024-12-09 PROCEDURE — 96365 THER/PROPH/DIAG IV INF INIT: CPT

## 2024-12-09 PROCEDURE — 96361 HYDRATE IV INFUSION ADD-ON: CPT

## 2024-12-09 PROCEDURE — 83735 ASSAY OF MAGNESIUM: CPT | Performed by: EMERGENCY MEDICINE

## 2024-12-09 PROCEDURE — 99285 EMERGENCY DEPT VISIT HI MDM: CPT | Performed by: EMERGENCY MEDICINE

## 2024-12-09 PROCEDURE — 84100 ASSAY OF PHOSPHORUS: CPT | Performed by: EMERGENCY MEDICINE

## 2024-12-09 PROCEDURE — 85025 COMPLETE CBC W/AUTO DIFF WBC: CPT | Performed by: EMERGENCY MEDICINE

## 2024-12-09 PROCEDURE — 36415 COLL VENOUS BLD VENIPUNCTURE: CPT | Performed by: EMERGENCY MEDICINE

## 2024-12-09 PROCEDURE — 83690 ASSAY OF LIPASE: CPT | Performed by: EMERGENCY MEDICINE

## 2024-12-09 PROCEDURE — 82077 ASSAY SPEC XCP UR&BREATH IA: CPT | Performed by: EMERGENCY MEDICINE

## 2024-12-09 PROCEDURE — 96375 TX/PRO/DX INJ NEW DRUG ADDON: CPT

## 2024-12-09 PROCEDURE — 93005 ELECTROCARDIOGRAM TRACING: CPT

## 2024-12-09 RX ORDER — GABAPENTIN 300 MG/1
300 CAPSULE ORAL EVERY 8 HOURS PRN
Status: DISCONTINUED | OUTPATIENT
Start: 2024-12-09 | End: 2024-12-11 | Stop reason: HOSPADM

## 2024-12-09 RX ORDER — PHENOBARBITAL SODIUM 130 MG/ML
260 INJECTION, SOLUTION INTRAMUSCULAR; INTRAVENOUS ONCE
Status: COMPLETED | OUTPATIENT
Start: 2024-12-09 | End: 2024-12-09

## 2024-12-09 RX ORDER — ONDANSETRON 2 MG/ML
4 INJECTION INTRAMUSCULAR; INTRAVENOUS ONCE
Status: COMPLETED | OUTPATIENT
Start: 2024-12-09 | End: 2024-12-09

## 2024-12-09 RX ORDER — POTASSIUM CHLORIDE 1500 MG/1
40 TABLET, EXTENDED RELEASE ORAL ONCE
Status: COMPLETED | OUTPATIENT
Start: 2024-12-09 | End: 2024-12-09

## 2024-12-09 RX ORDER — TRAZODONE HYDROCHLORIDE 50 MG/1
50 TABLET, FILM COATED ORAL
Status: DISCONTINUED | OUTPATIENT
Start: 2024-12-09 | End: 2024-12-11 | Stop reason: HOSPADM

## 2024-12-09 RX ORDER — POTASSIUM CHLORIDE 1500 MG/1
40 TABLET, EXTENDED RELEASE ORAL ONCE
Status: DISCONTINUED | OUTPATIENT
Start: 2024-12-09 | End: 2024-12-09

## 2024-12-09 RX ORDER — SODIUM CHLORIDE 9 MG/ML
125 INJECTION, SOLUTION INTRAVENOUS CONTINUOUS
Status: DISCONTINUED | OUTPATIENT
Start: 2024-12-09 | End: 2024-12-10

## 2024-12-09 RX ORDER — ONDANSETRON 2 MG/ML
4 INJECTION INTRAMUSCULAR; INTRAVENOUS EVERY 6 HOURS PRN
Status: DISCONTINUED | OUTPATIENT
Start: 2024-12-09 | End: 2024-12-11 | Stop reason: HOSPADM

## 2024-12-09 RX ORDER — ESCITALOPRAM OXALATE 10 MG/1
20 TABLET ORAL DAILY
Status: DISCONTINUED | OUTPATIENT
Start: 2024-12-10 | End: 2024-12-11 | Stop reason: HOSPADM

## 2024-12-09 RX ORDER — PHENOBARBITAL SODIUM 130 MG/ML
130 INJECTION, SOLUTION INTRAMUSCULAR; INTRAVENOUS ONCE
Status: COMPLETED | OUTPATIENT
Start: 2024-12-09 | End: 2024-12-09

## 2024-12-09 RX ORDER — POTASSIUM CHLORIDE 14.9 MG/ML
20 INJECTION INTRAVENOUS ONCE
Status: COMPLETED | OUTPATIENT
Start: 2024-12-09 | End: 2024-12-10

## 2024-12-09 RX ORDER — ENOXAPARIN SODIUM 100 MG/ML
40 INJECTION SUBCUTANEOUS DAILY
Status: DISCONTINUED | OUTPATIENT
Start: 2024-12-10 | End: 2024-12-11 | Stop reason: HOSPADM

## 2024-12-09 RX ORDER — ACETAMINOPHEN 325 MG/1
650 TABLET ORAL EVERY 6 HOURS PRN
Status: DISCONTINUED | OUTPATIENT
Start: 2024-12-09 | End: 2024-12-11 | Stop reason: HOSPADM

## 2024-12-09 RX ORDER — DIAZEPAM 10 MG/2ML
10 INJECTION, SOLUTION INTRAMUSCULAR; INTRAVENOUS ONCE
Status: COMPLETED | OUTPATIENT
Start: 2024-12-09 | End: 2024-12-09

## 2024-12-09 RX ADMIN — SODIUM CHLORIDE 125 ML/HR: 0.9 INJECTION, SOLUTION INTRAVENOUS at 22:21

## 2024-12-09 RX ADMIN — ONDANSETRON 4 MG: 2 INJECTION INTRAMUSCULAR; INTRAVENOUS at 18:29

## 2024-12-09 RX ADMIN — DIBASIC SODIUM PHOSPHATE, MONOBASIC POTASSIUM PHOSPHATE AND MONOBASIC SODIUM PHOSPHATE 1 TABLET: 852; 155; 130 TABLET ORAL at 22:04

## 2024-12-09 RX ADMIN — DIAZEPAM 10 MG: 10 INJECTION, SOLUTION INTRAMUSCULAR; INTRAVENOUS at 22:05

## 2024-12-09 RX ADMIN — ONDANSETRON 4 MG: 2 INJECTION INTRAMUSCULAR; INTRAVENOUS at 22:05

## 2024-12-09 RX ADMIN — THIAMINE HYDROCHLORIDE 100 MG: 100 INJECTION, SOLUTION INTRAMUSCULAR; INTRAVENOUS at 18:22

## 2024-12-09 RX ADMIN — SODIUM CHLORIDE 1000 ML: 0.9 INJECTION, SOLUTION INTRAVENOUS at 18:22

## 2024-12-09 RX ADMIN — POTASSIUM CHLORIDE 20 MEQ: 14.9 INJECTION, SOLUTION INTRAVENOUS at 22:21

## 2024-12-09 RX ADMIN — PHENOBARBITAL SODIUM 130 MG: 130 INJECTION INTRAMUSCULAR; INTRAVENOUS at 23:18

## 2024-12-09 RX ADMIN — PHENOBARBITAL SODIUM 260 MG: 130 INJECTION INTRAMUSCULAR; INTRAVENOUS at 22:04

## 2024-12-09 RX ADMIN — POTASSIUM CHLORIDE 40 MEQ: 1500 TABLET, EXTENDED RELEASE ORAL at 21:09

## 2024-12-09 NOTE — LETTER
[facility] [address]  [address] [address]  [phone]  Dept: [phone]    December 11, 2024     Patient: Mckenna Brown   YOB: 1985   Date of Visit: 12/9/2024       To Whom it May Concern:      Mckenna Brown is currently hospitalized under my professional care. She was hospitalized from 12/9/2024 to 12/11/24. Please excuse from work during this time. Patient may return to work at previous activity level on/or after 12/13/2024.    Feel free to call with any questions.    Sincerely,            Todd Pina DO FACP  [facility]  Diplomate, [employer] Board of Internal Medicine

## 2024-12-09 NOTE — ED PROVIDER NOTES
Time reflects when diagnosis was documented in both MDM as applicable and the Disposition within this note       Time User Action Codes Description Comment    12/9/2024  8:56 PM Narendra Montez Add [F10.10] Alcohol abuse     12/9/2024  8:56 PM Narendra Montez Add [E87.6] Acute hypokalemia           ED Disposition       ED Disposition   Admit    Condition   Stable    Date/Time   Mon Dec 9, 2024  8:56 PM    Comment   Case was discussed with detox and the patient's admission status was agreed to be Admission Status: inpatient status to the service of Dr. Pina .               Assessment & Plan       Medical Decision Making  Patient evaluated for alcohol abuse requesting detox patient does not have a gap acidosis she has no overt signs of withdrawal at this time she did have mild hypokalemia and hypophosphatemia the case was discussed with detox and they would admit to her service for further care patient had no vomiting here she did receive Zofran as well as potassium and phosphate abdomen is benign nonsurgical no elevation in LFTs    Amount and/or Complexity of Data Reviewed  Labs: ordered.    Risk  Prescription drug management.  Decision regarding hospitalization.             Medications   potassium-sodium phosphateS (K-PHOS,PHOSPHA 250) -250 mg tablet 1 tablet (has no administration in time range)   potassium chloride (Klor-Con M20) CR tablet 40 mEq (has no administration in time range)   sodium chloride 0.9 % bolus 1,000 mL (0 mL Intravenous Stopped 12/9/24 2035)   thiamine (VITAMIN B1) 100 mg in sodium chloride 0.9 % 50 mL IVPB (0 mg Intravenous Stopped 12/9/24 1852)   ondansetron (ZOFRAN) injection 4 mg (4 mg Intravenous Given 12/9/24 1829)       ED Risk Strat Scores                CIWA-Ar Score       Row Name 12/09/24 4897             CIWA-Ar    Nausea and Vomiting 1      Tactile Disturbances 0      Tremor 0      Auditory Disturbances 0      Paroxysmal Sweats 0      Visual Disturbances 0      Anxiety 2       Headache, Fullness in Head 0      Agitation 0      Orientation and Clouding of Sensorium 0      CIWA-Ar Total 3                              SBIRT 20yo+      Flowsheet Row Most Recent Value   Initial Alcohol Screen: US AUDIT-C     1. How often do you have a drink containing alcohol? 6 Filed at: 12/09/2024 1820   2. How many drinks containing alcohol do you have on a typical day you are drinking?  6 Filed at: 12/09/2024 1820   3b. FEMALE Any Age, or MALE 65+: How often do you have 4 or more drinks on one occassion? 6 Filed at: 12/09/2024 1820   Audit-C Score 18 Filed at: 12/09/2024 1820   Full Alcohol Screen: US AUDIT    4. How often during the last year have you found that you were not able to stop drinking once you had started? 4 Filed at: 12/09/2024 1820   5. How often during past year have you failed to do what was normally expected of you because of drinking?  3 Filed at: 12/09/2024 1820   6. How often in past year have you needed a first drink in the morning to get yourself going after a heavy drinking session?  3 Filed at: 12/09/2024 1820   7. How often in past year have you had feeling of guilt or remorse after drinking?  4 Filed at: 12/09/2024 1820   8. How often in past year have you been unable to remember what happened night before because you had been drinking?  2 Filed at: 12/09/2024 1820   9. Have you or someone else been injured as a result of your drinking?  0 Filed at: 12/09/2024 1820   10. Has a relative, friend, doctor or other health worker been concerned about your drinking and suggested you cut down?  4 Filed at: 12/09/2024 1820   AUDIT Total Score 38 Filed at: 12/09/2024 1820   UZAIR: How many times in the past year have you...    Used an illegal drug or used a prescription medication for non-medical reasons? Never Filed at: 12/09/2024 1804                            History of Present Illness       Chief Complaint   Patient presents with    Detox Evaluation     Pt looking to detox from  "drinking alcohol. States she drinks 1/2 bottle of vodka daily. \"In detox for the same recently but relapsed three weeks ago.\" Denies withdrawal seizures.        Past Medical History:   Diagnosis Date    Anxiety     Migraine       Past Surgical History:   Procedure Laterality Date     SECTION        History reviewed. No pertinent family history.   Social History     Tobacco Use    Smoking status: Never    Smokeless tobacco: Never   Vaping Use    Vaping status: Never Used   Substance Use Topics    Alcohol use: Yes     Comment: 1/2 bottle vodka/day    Drug use: Yes     Types: Marijuana, Benzodiazepines      E-Cigarette/Vaping    E-Cigarette Use Never User       E-Cigarette/Vaping Substances      I have reviewed and agree with the history as documented.     Patient with a history of alcohol abuse was on the detox service here back in August was given phenobarbital says she went to rehab but she started drinking again the last few weeks drinking a half a bottle of vodka a day she said today yesterday she had a half a bottle yesterday and today she had 6 white claws she is having some shakes and had been vomiting denies any chest pain shortness of breath fever vomiting diarrhea pain with urination no history of withdrawal seizures or hallucinations      Detox Evaluation  Associated symptoms: vomiting    Associated symptoms: no abdominal pain, no confusion, no hallucinations, no palpitations, no seizures, no shortness of breath and no suicidal ideation        Review of Systems   Constitutional:  Negative for chills and fever.   Respiratory:  Negative for shortness of breath.    Cardiovascular:  Negative for chest pain and palpitations.   Gastrointestinal:  Positive for vomiting. Negative for abdominal pain and diarrhea.   Genitourinary:  Negative for dysuria.   Musculoskeletal:  Negative for arthralgias and back pain.   Skin:  Negative for color change and rash.   Neurological:  Negative for seizures and " syncope.   Psychiatric/Behavioral:  Negative for confusion, hallucinations and suicidal ideas.    All other systems reviewed and are negative.          Objective       ED Triage Vitals [12/09/24 1800]   Temperature Pulse Blood Pressure Respirations SpO2 Patient Position - Orthostatic VS   98.9 °F (37.2 °C) 94 120/96 18 94 % Sitting      Temp Source Heart Rate Source BP Location FiO2 (%) Pain Score    Oral Monitor Right arm -- --      Vitals      Date and Time Temp Pulse SpO2 Resp BP Pain Score FACES Pain Rating User   12/09/24 1800 98.9 °F (37.2 °C) 94 94 % 18 120/96 -- -- JW            Physical Exam  Vitals and nursing note reviewed.   Constitutional:       General: She is not in acute distress.     Appearance: She is well-developed. She is not ill-appearing, toxic-appearing or diaphoretic.   HENT:      Head: Normocephalic and atraumatic.      Mouth/Throat:      Mouth: Mucous membranes are moist.   Eyes:      Conjunctiva/sclera: Conjunctivae normal.   Cardiovascular:      Rate and Rhythm: Normal rate and regular rhythm.      Heart sounds: No murmur heard.  Pulmonary:      Effort: Pulmonary effort is normal. No respiratory distress.      Breath sounds: Normal breath sounds.   Abdominal:      General: There is no distension.      Palpations: Abdomen is soft. There is no mass.      Tenderness: There is no abdominal tenderness. There is no right CVA tenderness, left CVA tenderness, guarding or rebound.   Musculoskeletal:         General: No swelling.      Cervical back: Neck supple.   Skin:     General: Skin is warm and dry.      Capillary Refill: Capillary refill takes less than 2 seconds.      Coloration: Skin is not jaundiced.   Neurological:      General: No focal deficit present.      Mental Status: She is alert.      Comments: Oriented   Psychiatric:         Mood and Affect: Mood normal.         Results Reviewed       Procedure Component Value Units Date/Time    Magnesium [[id number]]  (Normal) Collected: 12/09/24  1821    Lab Status: Final result Specimen: Blood from Arm, Left Updated: 12/09/24 1851     Magnesium 2.6 mg/dL     Phosphorus [[id number]]  (Abnormal) Collected: 12/09/24 1821    Lab Status: Final result Specimen: Blood from Arm, Left Updated: 12/09/24 1851     Phosphorus 2.1 mg/dL     Ethanol [[id number]]  (Abnormal) Collected: 12/09/24 1821    Lab Status: Final result Specimen: Blood from Arm, Left Updated: 12/09/24 1851     Ethanol Lvl 237 mg/dL     Comprehensive metabolic panel [[id number]]  (Abnormal) Collected: 12/09/24 1821    Lab Status: Final result Specimen: Blood from Arm, Left Updated: 12/09/24 1851     Sodium 140 mmol/L      Potassium 3.2 mmol/L      Chloride 104 mmol/L      CO2 26 mmol/L      ANION GAP 10 mmol/L      BUN 3 mg/dL      Creatinine 0.58 mg/dL      Glucose 117 mg/dL      Calcium 8.4 mg/dL      AST 28 U/L      ALT 12 U/L      Alkaline Phosphatase 52 U/L      Total Protein 8.2 g/dL      Albumin 4.7 g/dL      Total Bilirubin 0.42 mg/dL      eGFR 115 ml/min/1.73sq m     Narrative:      [employer] guidelines for Chronic Kidney Disease (CKD):     Stage 1 with normal or high GFR (GFR > 90 mL/min/1.73 square meters)    Stage 2 Mild CKD (GFR = 60-89 mL/min/1.73 square meters)    Stage 3A Moderate CKD (GFR = 45-59 mL/min/1.73 square meters)    Stage 3B Moderate CKD (GFR = 30-44 mL/min/1.73 square meters)    Stage 4 Severe CKD (GFR = 15-29 mL/min/1.73 square meters)    Stage 5 End Stage CKD (GFR <15 mL/min/1.73 square meters)  Note: GFR calculation is accurate only with a steady state creatinine    Lipase [[id number]]  (Normal) Collected: 12/09/24 1821    Lab Status: Final result Specimen: Blood from Arm, Left Updated: 12/09/24 1851     Lipase 27 u/L     Rapid drug screen, urine [[id number]]  (Normal) Collected: 12/09/24 1824    Lab Status: Final result Specimen: Urine, Clean Catch Updated: 12/09/24 1850     Amph/Meth UR Negative     Barbiturate Ur Negative     Benzodiazepine Urine  Negative     Cocaine Urine Negative     Methadone Urine Negative     Opiate Urine Negative     PCP Ur Negative     THC Urine Negative     Oxycodone Urine Negative     Fentanyl Urine Negative     HYDROCODONE URINE Negative    Narrative:      FOR MEDICAL PURPOSES ONLY.   IF CONFIRMATION NEEDED PLEASE CONTACT THE LAB WITHIN 5 DAYS.    Drug Screen Cutoff Levels:  AMPHETAMINE/METHAMPHETAMINES  1000 ng/mL  BARBITURATES     200 ng/mL  BENZODIAZEPINES     200 ng/mL  COCAINE      300 ng/mL  METHADONE      300 ng/mL  OPIATES      300 ng/mL  PHENCYCLIDINE     25 ng/mL  THC       50 ng/mL  OXYCODONE      100 ng/mL  FENTANYL      5 ng/mL  HYDROCODONE     300 ng/mL    CBC and differential [[id number]]  (Abnormal) Collected: 12/09/24 1821    Lab Status: Final result Specimen: Blood from Arm, Left Updated: 12/09/24 1832     WBC 6.13 Thousand/uL      RBC 4.04 Million/uL      Hemoglobin 12.9 g/dL      Hematocrit 38.1 %      MCV 94 fL      MCH 31.9 pg      MCHC 33.9 g/dL      RDW 14.8 %      MPV 8.1 fL      Platelets 177 Thousands/uL      nRBC 0 /100 WBCs      Segmented % 45 %      Immature Grans % 0 %      Lymphocytes % 42 %      Monocytes % 9 %      Eosinophils Relative 3 %      Basophils Relative 1 %      Absolute Neutrophils 2.75 Thousands/µL      Absolute Immature Grans 0.01 Thousand/uL      Absolute Lymphocytes 2.60 Thousands/µL      Absolute Monocytes 0.55 Thousand/µL      Eosinophils Absolute 0.17 Thousand/µL      Basophils Absolute 0.05 Thousands/µL     POCT pregnancy, urine [[id number]]  (Normal) Collected: 12/09/24 1822    Lab Status: Final result Updated: 12/09/24 1822     EXT Preg Test, Ur Negative     Control Valid            No orders to display       ECG 12 Lead Documentation Only    Date/Time: 12/9/2024 6:33 PM    Performed by: Narendra Montez MD  Authorized by: Narendra Montez MD    Indications / Diagnosis:  Alcohol abuse  Patient location:  ED  Interpretation:     Interpretation: normal    Rate:     ECG rate:  84    ECG  rate assessment: normal    Rhythm:     Rhythm: sinus rhythm    Ectopy:     Ectopy: none    QRS:     QRS intervals:  Normal  ST segments:     ST segments:  Normal  Comments:      Qt nml      ED Medication and Procedure Management   Prior to Admission Medications   Prescriptions Last Dose Informant Patient Reported? Taking?   clonazePAM (KlonoPIN) 1 mg tablet   No No   Sig: Take 1 tablet (1 mg total) by mouth 2 (two) times a day for 14 days   escitalopram (LEXAPRO) 20 mg tablet   No No   Sig: Take 1 tablet (20 mg total) by mouth daily for 14 days   gabapentin (NEURONTIN) 300 mg capsule   No No   Sig: Take 1 capsule (300 mg total) by mouth daily at bedtime for 14 days   prazosin (MINIPRESS) 1 mg capsule   No No   Sig: Take 1 capsule (1 mg total) by mouth daily at bedtime for 14 days      Facility-Administered Medications: None     Patient's Medications   Discharge Prescriptions    No medications on file     No discharge procedures on file.  ED SEPSIS DOCUMENTATION   Time reflects when diagnosis was documented in both MDM as applicable and the Disposition within this note       Time User Action Codes Description Comment    12/9/2024  8:56 PM Narendra Montez [F10.10] Alcohol abuse     12/9/2024  8:56 PM Narendra Montez [E87.6] Acute hypokalemia                  Narendra Montez MD  12/09/24 2058

## 2024-12-10 PROBLEM — J02.9 PHARYNGITIS: Status: ACTIVE | Noted: 2024-12-10

## 2024-12-10 LAB
ALBUMIN SERPL BCG-MCNC: 4.1 G/DL (ref 3.5–5)
ALP SERPL-CCNC: 42 U/L (ref 34–104)
ALT SERPL W P-5'-P-CCNC: 12 U/L (ref 7–52)
ANION GAP SERPL CALCULATED.3IONS-SCNC: 9 MMOL/L (ref 4–13)
AST SERPL W P-5'-P-CCNC: 61 U/L (ref 13–39)
BILIRUB SERPL-MCNC: 0.71 MG/DL (ref 0.2–1)
BUN SERPL-MCNC: 3 MG/DL (ref 5–25)
CALCIUM SERPL-MCNC: 7.4 MG/DL (ref 8.4–10.2)
CHLORIDE SERPL-SCNC: 104 MMOL/L (ref 96–108)
CO2 SERPL-SCNC: 23 MMOL/L (ref 21–32)
CREAT SERPL-MCNC: 0.58 MG/DL (ref 0.6–1.3)
ERYTHROCYTE [DISTWIDTH] IN BLOOD BY AUTOMATED COUNT: 14.9 % (ref 11.6–15.1)
GFR SERPL CREATININE-BSD FRML MDRD: 115 ML/MIN/1.73SQ M
GLUCOSE SERPL-MCNC: 95 MG/DL (ref 65–140)
HCT VFR BLD AUTO: 32.3 % (ref 34.8–46.1)
HGB BLD-MCNC: 10.8 G/DL (ref 11.5–15.4)
MAGNESIUM SERPL-MCNC: 2 MG/DL (ref 1.9–2.7)
MCH RBC QN AUTO: 31.8 PG (ref 26.8–34.3)
MCHC RBC AUTO-ENTMCNC: 33.4 G/DL (ref 31.4–37.4)
MCV RBC AUTO: 95 FL (ref 82–98)
PHOSPHATE SERPL-MCNC: 2.5 MG/DL (ref 2.7–4.5)
PLATELET # BLD AUTO: 138 THOUSANDS/UL (ref 149–390)
PMV BLD AUTO: 8.7 FL (ref 8.9–12.7)
POTASSIUM SERPL-SCNC: 3.8 MMOL/L (ref 3.5–5.3)
PROT SERPL-MCNC: 6.6 G/DL (ref 6.4–8.4)
RBC # BLD AUTO: 3.4 MILLION/UL (ref 3.81–5.12)
SODIUM SERPL-SCNC: 136 MMOL/L (ref 135–147)
WBC # BLD AUTO: 6.16 THOUSAND/UL (ref 4.31–10.16)

## 2024-12-10 PROCEDURE — 83735 ASSAY OF MAGNESIUM: CPT | Performed by: PHYSICIAN ASSISTANT

## 2024-12-10 PROCEDURE — 85027 COMPLETE CBC AUTOMATED: CPT | Performed by: PHYSICIAN ASSISTANT

## 2024-12-10 PROCEDURE — 99223 1ST HOSP IP/OBS HIGH 75: CPT | Performed by: INTERNAL MEDICINE

## 2024-12-10 PROCEDURE — 99223 1ST HOSP IP/OBS HIGH 75: CPT | Performed by: EMERGENCY MEDICINE

## 2024-12-10 PROCEDURE — 80053 COMPREHEN METABOLIC PANEL: CPT | Performed by: PHYSICIAN ASSISTANT

## 2024-12-10 PROCEDURE — 84100 ASSAY OF PHOSPHORUS: CPT | Performed by: PHYSICIAN ASSISTANT

## 2024-12-10 RX ORDER — QUETIAPINE FUMARATE 50 MG/1
50 TABLET, FILM COATED ORAL
Status: DISCONTINUED | OUTPATIENT
Start: 2024-12-10 | End: 2024-12-11 | Stop reason: HOSPADM

## 2024-12-10 RX ORDER — PHENOBARBITAL 64.8 MG/1
64.8 TABLET ORAL ONCE
Status: COMPLETED | OUTPATIENT
Start: 2024-12-10 | End: 2024-12-10

## 2024-12-10 RX ORDER — PHENOBARBITAL SODIUM 130 MG/ML
130 INJECTION, SOLUTION INTRAMUSCULAR; INTRAVENOUS ONCE
Status: COMPLETED | OUTPATIENT
Start: 2024-12-10 | End: 2024-12-10

## 2024-12-10 RX ORDER — FOLIC ACID 1 MG/1
1 TABLET ORAL DAILY
Status: DISCONTINUED | OUTPATIENT
Start: 2024-12-11 | End: 2024-12-11 | Stop reason: HOSPADM

## 2024-12-10 RX ORDER — FAMOTIDINE 20 MG/1
20 TABLET, FILM COATED ORAL 2 TIMES DAILY
Status: DISCONTINUED | OUTPATIENT
Start: 2024-12-10 | End: 2024-12-11 | Stop reason: HOSPADM

## 2024-12-10 RX ORDER — LANOLIN ALCOHOL/MO/W.PET/CERES
100 CREAM (GRAM) TOPICAL DAILY
Status: DISCONTINUED | OUTPATIENT
Start: 2024-12-11 | End: 2024-12-11 | Stop reason: HOSPADM

## 2024-12-10 RX ORDER — QUETIAPINE FUMARATE 50 MG/1
50 TABLET, FILM COATED ORAL
COMMUNITY
Start: 2024-11-12

## 2024-12-10 RX ORDER — PRAZOSIN HYDROCHLORIDE 2 MG/1
2 CAPSULE ORAL
COMMUNITY
Start: 2024-12-08

## 2024-12-10 RX ORDER — GABAPENTIN 600 MG/1
600 TABLET ORAL
COMMUNITY
Start: 2024-09-18

## 2024-12-10 RX ORDER — DIPHENHYDRAMINE HYDROCHLORIDE AND LIDOCAINE HYDROCHLORIDE AND ALUMINUM HYDROXIDE AND MAGNESIUM HYDRO
10 KIT
Status: DISCONTINUED | OUTPATIENT
Start: 2024-12-10 | End: 2024-12-11 | Stop reason: HOSPADM

## 2024-12-10 RX ORDER — LOPERAMIDE HYDROCHLORIDE 2 MG/1
2 CAPSULE ORAL 3 TIMES DAILY PRN
Status: DISCONTINUED | OUTPATIENT
Start: 2024-12-10 | End: 2024-12-11 | Stop reason: HOSPADM

## 2024-12-10 RX ORDER — PRAZOSIN HYDROCHLORIDE 1 MG/1
2 CAPSULE ORAL
Status: DISCONTINUED | OUTPATIENT
Start: 2024-12-10 | End: 2024-12-11 | Stop reason: HOSPADM

## 2024-12-10 RX ADMIN — GABAPENTIN 300 MG: 300 CAPSULE ORAL at 04:16

## 2024-12-10 RX ADMIN — PHENOBARBITAL SODIUM 130 MG: 130 INJECTION INTRAMUSCULAR; INTRAVENOUS at 04:27

## 2024-12-10 RX ADMIN — DIPHENHYDRAMINE HYDROCHLORIDE AND LIDOCAINE HYDROCHLORIDE AND ALUMINUM HYDROXIDE AND MAGNESIUM HYDRO 10 ML: KIT at 11:58

## 2024-12-10 RX ADMIN — FAMOTIDINE 20 MG: 20 TABLET, FILM COATED ORAL at 09:25

## 2024-12-10 RX ADMIN — DIBASIC SODIUM PHOSPHATE, MONOBASIC POTASSIUM PHOSPHATE AND MONOBASIC SODIUM PHOSPHATE 1 TABLET: 852; 155; 130 TABLET ORAL at 08:26

## 2024-12-10 RX ADMIN — QUETIAPINE FUMARATE 50 MG: 50 TABLET ORAL at 21:01

## 2024-12-10 RX ADMIN — PHENOBARBITAL 64.8 MG: 64.8 TABLET ORAL at 05:32

## 2024-12-10 RX ADMIN — LOPERAMIDE HYDROCHLORIDE 2 MG: 2 CAPSULE ORAL at 10:42

## 2024-12-10 RX ADMIN — DIBASIC SODIUM PHOSPHATE, MONOBASIC POTASSIUM PHOSPHATE AND MONOBASIC SODIUM PHOSPHATE 1 TABLET: 852; 155; 130 TABLET ORAL at 16:26

## 2024-12-10 RX ADMIN — PHENOBARBITAL SODIUM 130 MG: 130 INJECTION INTRAMUSCULAR; INTRAVENOUS at 02:05

## 2024-12-10 RX ADMIN — FAMOTIDINE 20 MG: 20 TABLET, FILM COATED ORAL at 17:21

## 2024-12-10 RX ADMIN — LOPERAMIDE HYDROCHLORIDE 2 MG: 2 CAPSULE ORAL at 21:19

## 2024-12-10 RX ADMIN — DIBASIC SODIUM PHOSPHATE, MONOBASIC POTASSIUM PHOSPHATE AND MONOBASIC SODIUM PHOSPHATE 1 TABLET: 852; 155; 130 TABLET ORAL at 11:59

## 2024-12-10 RX ADMIN — MULTIPLE VITAMINS W/ MINERALS TAB 1 TABLET: TAB ORAL at 08:26

## 2024-12-10 RX ADMIN — PRAZOSIN HYDROCHLORIDE 2 MG: 1 CAPSULE ORAL at 21:00

## 2024-12-10 RX ADMIN — DIBASIC SODIUM PHOSPHATE, MONOBASIC POTASSIUM PHOSPHATE AND MONOBASIC SODIUM PHOSPHATE 1 TABLET: 852; 155; 130 TABLET ORAL at 21:01

## 2024-12-10 RX ADMIN — DIPHENHYDRAMINE HYDROCHLORIDE AND LIDOCAINE HYDROCHLORIDE AND ALUMINUM HYDROXIDE AND MAGNESIUM HYDRO 10 ML: KIT at 16:26

## 2024-12-10 RX ADMIN — ESCITALOPRAM OXALATE 20 MG: 10 TABLET ORAL at 08:26

## 2024-12-10 RX ADMIN — PHENOBARBITAL SODIUM 130 MG: 130 INJECTION INTRAMUSCULAR; INTRAVENOUS at 10:42

## 2024-12-10 NOTE — ASSESSMENT & PLAN NOTE
Patient endorses a h/o chronic anxiety and depression  Home medications include: Lexapro 20 mg daily, Klonopin 1mg BID   Denies SI/HI/thoughts of self harm  Continue home medications but hold off Klonopin while under SEWs protocol  Gabapentin 300 mg TID prn ordered  Recommend OP f/u with PCP/OP Psychiatry

## 2024-12-10 NOTE — ASSESSMENT & PLAN NOTE
The patient is experiencing acute alcohol withdrawal symptoms, including anxiety, tremors, nausea, with a SEWS score of 11 on admission.  History of severe alcohol use disorder with recent relapse after previous inpatient rehabilitation.  Chronic daily alcohol consumption, primarily vodka, with recent increased intake.  Drinks 1/2 bottle of vodka daily  Last drink: on 12/09/2024 at around 3:00 PM  Ethanol lvl: 237  No history of withdrawal seizures.  H/o non-commanding auditory hallucinations.  Denies recent opioid use.  Follow SEWS protocol with symptom-triggered phenobarbital for medically-assisted alcohol withdrawal  Administered thus far 714.8 mg phenobarbital + valium 10 mg IV   Continuous pulse ox or telemetry monitoring   Continue to monitor vitals, symptoms

## 2024-12-10 NOTE — ASSESSMENT & PLAN NOTE
Recent Labs     12/09/24  1821   ETOH 237*     H/o chronic daily alcohol use  Last drink 12/09 3pm  Toxicology consulted during hospital course: appreciate recommendation  Initiated on SEWS protocol for medical management of alcohol withdrawal  Initial SEWs score 11  Received initial 260 mg Phenobarbital and 10 mg Valium IV  C/w monitoring under protocol and administer phenobarbital as indicated  C/w pulse ox and telemetry monitoring

## 2024-12-10 NOTE — H&P
HISTORY & PHYSICAL EXAM  DEPARTMENT OF MEDICAL TOXICOLOGY  LEVEL 4 MEDICAL DETOX UNIT  Mckenna Brown 39 y.o. female MRN: [id number]  Unit/Bed#:  DETOX 505-01 Encounter: [id number]      Reason for Admission/Principal Problem: Ethanol withdrawal, Ethanol use disorder  Admitting Provider: Lluvia Don PA-C  Attending Provider: Todd Pina DO   12/9/2024  5:56 PM        Benzodiazepine dependence (HCC)  Assessment & Plan  Patient with a h/o chronic benzodiazepine use for anxiety  Using Klonopin 1 mg BID; recently filled 30 dys prescription on 11/13/2024  Hold medication while undergoing acute treatment for alcohol withdrawal  Denies H/o benzo withdrawal seizures  Withdrawal management under SEWS protocol as above   Consult case management/CRS for assistance with aftercare resources - pt interested in outpatient resources  Encourage cessation of benzodiazepine use     Hypophosphatemia  Assessment & Plan  Recent Labs     12/09/24  1821   PHOS 2.1*     Repleted, monitor AM lab for improvement    Hypokalemia  Assessment & Plan  Recent Labs     12/09/24  1821   K 3.2*     Repleted, monitor AM lab for improvement    Alcohol use disorder, severe, dependence (HCC)  Assessment & Plan  Pt with a h/o chronic heavy alcohol use  Was recently admitted to [facility] detox 08/2024 and later discharged to a 21 dys inpatient alcohol rehab at [place]  Relapsed about 60 dys after discharged from rehab  Drinks 1/5th of Vodka daily for the past three weeks and today, drank 6 white claws  Denies H/o withdrawal seizures  Withdrawal management as above  Initiate IVFs, IV thiamine, folic acid, and MVI  Consult case management/CRS for assistance with aftercare resources - pt interested in outpatient drug and alcohol rehab at this time     Anxiety and depression  Assessment & Plan  Patient endorses a h/o chronic anxiety and depression  Home medications include: Lexapro 20 mg daily, Klonopin 1mg BID   Denies SI/HI/thoughts of self  harm  Continue home medications but hold off Klonopin while under [facility] protocol  Gabapentin 300 mg TID prn ordered  Recommend OP f/u with PCP/OP Psychiatry     * Alcohol withdrawal syndrome with complication (HCC)  Assessment & Plan  Recent Labs     12/09/24  1821   ETOH 237*     H/o chronic daily alcohol use  Last drink 12/09 3pm  Toxicology consulted during hospital course: appreciate recommendation  Initiated on [facility] protocol for medical management of alcohol withdrawal  Initial SEWs score 11  Received initial 260 mg Phenobarbital and 10 mg Valium IV  C/w monitoring under protocol and administer phenobarbital as indicated  C/w pulse ox and telemetry monitoring                VTE Prophylaxis: Enoxaparin (Lovenox)  / sequential compression device   Code Status: Full code      Anticipated Length of Stay:  Patient will be admitted on an Inpatient basis with an anticipated length of stay of  2-3  midnights.   Justification for Hospital Stay: Alcohol withdrawal syndrome    For any questions or concerns, please Tiger Text the advanced practitioner in the role of \[facility]\""-DETOX-AP On Call      This patient qualifies for Level IV medically managed intensive inpatient services under the criteria set by the American Society of Addiction Medicine, including dimensions 1-3. The patient is in withdrawal (or is intoxicated with high risk of withdrawal), with severe and unstable medical and/or psychiatric (dual diagnosis) problems, requiring requires 24-hour medical and nursing care and the full resources of a licensed hospital.          [facility] PHENOBARBITAL PROTOCOL FOR ALCOHOL WITHDRAWAL    Admit patient to medical detox unit and continue supportive care and stabilization of acute ethanol withdrawal per medical toxicology/detox treatment pathway. Monitor ethanol withdrawal severity via the Severity of Ethanol Withdrawal Scale (SEWS) Q4 hours and then hourly if/when SEWS > 6. Treat withdrawal per pathway and reassess Q30-60 minutes.            Mild SEWS Score 1-6  Administer medications* (IV or PO; PO preferred):  If initial SEWS score: diazepam 10mg PO/IV x 1 AND phenobarbital 65 mg PO/IV x 1  If repeat SEWS score 1-6: phenobarbital 65 mg PO/IV q1 hour x 5 doses maximum   Reassessment:   SEWS q1 hour after each dose until SEWS 0 x 2 hours  VS q1 hours (until SEWS 0, then q4 hours)  Notify provider for bedside evaluation if 5-dose maximum is reached, RASS of -3 to -5, or SEWS score escalates to moderate or severe.   Moderate SEWS Score 7-12  Administer medications* (IV):  If initial SEWS score: diazepam 10mg IV x 1 AND phenobarbital 260 mg IV x 1  If repeat SEWS score 7-12 or score escalated from mild: phenobarbital 130 mg IV q30 minutes x 5 doses maximum   Reassessment:  SEWS q30 minutes after each dose until SEWS < 7 (then hourly until SEWS 0 x 2 hours)  VS q30 minutes until SEWS < 7 (then hourly until SEWS 0, then q4 hours)  Notify provider for bedside evaluation if 5-dose maximum is reached, RASS of -3 to -5, or SEWS score escalates to severe.   Severe SEWS Score >= 13  Administer medications* (IV):  If initial SEWS score: Diazepam 10 mg IV x 1 AND phenobarbital 650 mg IV piggyback x 1 over 15-30 minutes  If repeat SEWS score >= 13 or score escalated from mild or moderate: phenobarbital 130 mg IV q30 minutes x 5 doses maximum   Reassessment:  SEWS q30 minutes after each dose until SEWS < 7 (then hourly until SEWS 0 x 2 hours)   VS q30 minutes until SEWS < 7 (then hourly until SEWS 0, then q4 hours)  Notify provider for bedside evaluation if 5-dose maximum is reached or RASS of -3 to -5   *Hold medications and notify provider if CNS depression, respirations < 10/min, or RASS of -3 to -5.         Medications to be administered adjunctively if more than 2 grams of phenobarbital is needed for stabilization of withdrawal; require attending approval.   Dexmedetomidine infusion 0.1-1mcg/kg/hr IV infusion, titratable to reduced agitation (Goal: RASS  -2)  Ketamine   Acute agitated delirium: 1-2 mg/kg IV or 4-5 mg/kg IM  Refractory withdrawal: 0.1-1mg/kg/hr IV infusion, titratable to reduced agitation (Goal: RASS -2)    Further evaluation, screening and treatment:  Evaluate complete metabolic panel, transaminases, INR, and lipase. Assess hepatic ultrasound for any sign of alcoholic liver disease or cirrhosis, and ultimately refer for further hepatic evaluation and care as/if indicated.    Additional medications for ethanol associated malnutrition:  Thiamine 100 mg IV daily, increase to 500 mg TID for signs/symptoms of Wernicke's Encephalopathy or Wernicke Korsakoff Syndrome   Folic acid 1 mg IV daily   Multivitamin PO daily      Will offer first monthly injection of Naltrexone 380 mg IM, once patient is stabilized, as it has been shown to assist in decreasing cravings for ethanol.     Evaluate and treat for coexisting substance use, such as opioids and nicotine. Discuss risk factors for infectious disease, such as history of intravenous drug abuse, and offer hepatitis and HIV screening if indicated.     Case management consultation to assist with coordination of subsequent treatment after discharge.          Hx and PE limited by: None    HPI: Mckenna Brown is a 39 y.o. year old female with a past medical history of anxiety, depression and chronic alcohol use disorder who presented to [place] ED requesting alcohol detox.  Patient stated that she was last seen here at [facility] [place] detox in 08/2024 and was discharged to a 21 days inpatient rehab at [place].  Patient stated that upon discharge from rehab, relapsed about 60 days post discharge and since then, has been consuming excessive amount of vodka.  Admits consumes about 1/5 of vodka daily.  Patient admits for the past 3 weeks, has had multiple failed attempts at self detox.  Admits today, consumed about 6 white claws.  Patient endorsing shakes, nausea, vomiting and increased anxiety which  she attributes to her withdrawal symptoms.  Denies any visual or auditory hallucination, seizure, chest pain, shortness of breath, abdominal pain or any other complaint on review of systems.  Patient is interested in outpatient rehabilitation resources.    Preferred alcoholic beverage(s): Vodka  Quantity and frequency of alcohol intake: 1/5 of vodka daily  Use of any ethanol substitutes (toxic alcohols): no  Date/Time of last alcohol intake: 3 PM, 12/09  Current signs and symptoms of ethanol withdrawal: anxiety, tremor, tachycardia, nausea, and vomiting    SEWS Total Score: 5 (12/10/2024  5:27 AM)        Ethanol Withdrawal History  Previous ethanol withdrawal? yes  Prior inpatient treatment for ethanol withdrawal? yes  Prior outpatient treatment for ethanol withdrawal? yes  History of seizures with prior ethanol withdrawal? no  Prior treatment with naltrexone (Vivitrol)? yes  Current treatment with naltrexone (Vivitrol)? no  Other current treatment for ethanol use disorder? no  Co-existing substance use? no    Review of PDMP: yes     Social History     Substance and Sexual Activity   Alcohol Use Yes    Comment: 1/2 bottle vodka/day     Social History     Substance and Sexual Activity   Drug Use Yes    Types: Marijuana, Benzodiazepines     Social History     Tobacco Use   Smoking Status Never   Smokeless Tobacco Never       Review of Systems   Constitutional:  Negative for chills and fever.   HENT:  Negative for ear pain and sore throat.    Eyes:  Negative for pain and visual disturbance.   Respiratory:  Negative for cough and shortness of breath.    Cardiovascular:  Negative for chest pain and palpitations.   Gastrointestinal:  Positive for nausea and vomiting. Negative for abdominal pain.   Genitourinary:  Negative for dysuria and hematuria.   Musculoskeletal:  Negative for arthralgias and back pain.   Skin:  Negative for color change and rash.   Neurological:  Positive for tremors. Negative for seizures and  syncope.   Psychiatric/Behavioral:  The patient is nervous/anxious.    All other systems reviewed and are negative.      Historical Information   Past Medical History:   Diagnosis Date    Anxiety     Migraine      Past Surgical History:   Procedure Laterality Date     SECTION       History reviewed. No pertinent family history.  Social History   Marital Status: Single   Occupation: Insurance   Patient Pre-hospital Living Situation: Resides alone  Patient Pre-hospital Level of Mobility: Independent mobility  Patient Pre-hospital Diet Restrictions: None    No Known Allergies    Prior to Admission medications    Medication Sig Start Date End Date Taking? Authorizing Provider   clonazePAM (KlonoPIN) 1 mg tablet Take 1 tablet (1 mg total) by mouth 2 (two) times a day for 14 days 24  Vinay Baer PA-C   escitalopram (LEXAPRO) 20 mg tablet Take 1 tablet (20 mg total) by mouth daily for 14 days 24  Vinay Baer PA-C   gabapentin (NEURONTIN) 300 mg capsule Take 1 capsule (300 mg total) by mouth daily at bedtime for 14 days  Patient taking differently: Take 600 mg by mouth daily at bedtime 24  Vinay Baer PA-C   prazosin (MINIPRESS) 1 mg capsule Take 1 capsule (1 mg total) by mouth daily at bedtime for 14 days  Patient taking differently: Take 2 mg by mouth daily at bedtime 24  Vinay Baer PA-C         Current Facility-Administered Medications:     acetaminophen (TYLENOL) tablet 650 mg, Q6H PRN    enoxaparin (LOVENOX) subcutaneous injection 40 mg, Daily    escitalopram (LEXAPRO) tablet 20 mg, Daily    folic acid 1 mg, thiamine (VITAMIN B1) 100 mg in sodium chloride 0.9 % 100 mL IV piggyback, Daily    gabapentin (NEURONTIN) capsule 300 mg, Q8H PRN    multivitamin-minerals (CENTRUM) tablet 1 tablet, Daily    ondansetron (ZOFRAN) injection 4 mg, Q6H PRN    potassium-sodium phosphateS (K-PHOS,PHOSPHA 250) -250 mg tablet 1 tablet, 4x  Daily (with meals and at bedtime)    sodium chloride 0.9 % infusion, Continuous, Last Rate: 125 mL/hr (12/09/24 2221)    traZODone (DESYREL) tablet 50 mg, HS PRN    Continuous Infusions:sodium chloride, 125 mL/hr, Last Rate: 125 mL/hr (12/09/24 2221)             Objective       Intake/Output Summary (Last 24 hours) at 12/10/2024 0752  Last data filed at 12/10/2024 0501  Gross per 24 hour   Intake 1480 ml   Output --   Net 1480 ml       Invasive Devices:   Peripheral IV 12/09/24 Distal;Dorsal (posterior);Left Forearm (Active)   Site Assessment WDL;Clean;Dry;Intact 12/09/24 1815   Dressing Type Transparent 12/09/24 1815   Dressing Status Clean;Dry;Intact 12/09/24 1815       Vitals   Vitals:    12/10/24 0158 12/10/24 0415 12/10/24 0526 12/10/24 0714   BP: 124/94 139/89 132/90 152/96   TempSrc: Tympanic Temporal Temporal Temporal   Pulse: 74 87 56 90   Resp: 17 17 18 18   Patient Position - Orthostatic VS: Lying Lying Sitting Lying   Temp: 98 °F (36.7 °C) 97.6 °F (36.4 °C) 98.1 °F (36.7 °C) 97.7 °F (36.5 °C)       Physical Exam  Vitals and nursing note reviewed.   Constitutional:       Appearance: She is not ill-appearing or diaphoretic.   HENT:      Head: Normocephalic and atraumatic.      Nose: No congestion or rhinorrhea.   Eyes:      General: No scleral icterus.     Extraocular Movements: Extraocular movements intact.      Conjunctiva/sclera: Conjunctivae normal.      Pupils: Pupils are equal, round, and reactive to light.   Cardiovascular:      Rate and Rhythm: Normal rate and regular rhythm.   Pulmonary:      Effort: Pulmonary effort is normal. No respiratory distress.      Breath sounds: Normal breath sounds. No stridor. No wheezing.   Abdominal:      General: Bowel sounds are normal. There is no distension.      Palpations: Abdomen is soft.      Tenderness: There is no abdominal tenderness.   Musculoskeletal:         General: No swelling or tenderness.      Cervical back: Normal range of motion and neck supple.  "     Right lower leg: No edema.      Left lower leg: No edema.   Skin:     Capillary Refill: Capillary refill takes less than 2 seconds.      Findings: No bruising, lesion or rash.   Neurological:      Mental Status: She is oriented to person, place, and time.      GCS: GCS eye subscore is 4. GCS verbal subscore is 5. GCS motor subscore is 6.      Cranial Nerves: Cranial nerves 2-12 are intact.      Sensory: Sensation is intact.      Motor: Tremor present.      Coordination: Coordination is intact.      Gait: Gait is intact.      Comments: Mild tongue fasciculation   Psychiatric:         Attention and Perception: She does not perceive auditory or visual hallucinations.         Mood and Affect: Mood is anxious.         Thought Content: Thought content does not include homicidal or suicidal ideation. Thought content does not include homicidal or suicidal plan.         Data:    EKG, Pathology, and Other Studies: Results Review Statement: No pertinent imaging studies reviewed.  EKG: Reviewed    Lab Results:  CBC ETOH     Lab Results   Component Value Date    WBC 6.16 12/10/2024    RBC 3.40 (L) 12/10/2024    HGB 10.8 (L) 12/10/2024    HCT 32.3 (L) 12/10/2024    MCV 95 12/10/2024    MCH 31.8 12/10/2024    MCHC 33.4 12/10/2024    RDW 14.9 12/10/2024     (L) 12/10/2024    MPV 8.7 (L) 12/10/2024      No results found for: \"LACTICACID\"   CMP UA         Component Value Date/Time    K 3.2 (L) 12/09/2024 1821    K 4.0 11/01/2023 1026     12/09/2024 1821     11/01/2023 1026    CO2 26 12/09/2024 1821    CO2 28 11/01/2023 1026    BUN 3 (L) 12/09/2024 1821    BUN 11 11/01/2023 1026    CREATININE 0.58 (L) 12/09/2024 1821    CREATININE 0.72 11/01/2023 1026         Component Value Date/Time    CALCIUM 8.4 12/09/2024 1821    CALCIUM 9.7 11/01/2023 1026    ALKPHOS 52 12/09/2024 1821    ALKPHOS 32 (L) 11/01/2023 1026    AST 28 12/09/2024 1821    AST 22 11/01/2023 1026    ALT 12 12/09/2024 1821    ALT 21 11/01/2023 1026 " "     No results found for: \"CLARITYU\", \"COLORU\", \"SPECGRAV\", \"PHUR\", \"GLUCOSEU\", \"KETONESU\", \"BLOODU\", \"PROTEIN UA\", \"NITRITE\", \"BILIRUBINUR\", \"UROBILINOGEN\", \"LEUKOCYTESUR\", \"WBCUA\", \"RBCUA\", \"HYALINE\", \"BACTERIA\", \"EPIS\"     Liver Function Test: ASA     Lab Results   Component Value Date    TBILI 0.42 12/09/2024    TBILI 0.4 11/01/2023    ALKPHOS 52 12/09/2024    ALKPHOS 32 (L) 11/01/2023    AST 28 12/09/2024    AST 22 11/01/2023    ALT 12 12/09/2024    ALT 21 11/01/2023    TP 8.2 12/09/2024    TP 7.4 11/01/2023    ALB 4.7 12/09/2024    ALB 4.5 11/01/2023      No results found for: \"SALICYLATE\"   Troponin APAP     No results found for: \"TROPONINI\"   No results found for: \"ACTMNPHEN\"   VBG HCG     No results found for: \"PHVEN\", \"RNN0MDN\", \"PO2VEN\", \"BNU3TAH\", \"BEVEN\", \"O1LOTXDDH\", \"F0ZJKKC\"   No results found for: \"HCGQUANT\"   ABG Urine Drug Screen     No results found for: \"PHART\", \"FIY2DZB\", \"PO2ART\", \"UBM2MXI\", \"BEART\", \"L7ZESMOBQ\", \"O2HGB\", \"SOURC\", \"NIGEL\", \"VTAC\", \"ACRATE\", \"INSPIREDAIR\", \"PEEP\"   Lab Results   Component Value Date    AMPMETHUR Negative 12/09/2024    BARBTUR Negative 12/09/2024    BDZUR Negative 12/09/2024    COCAINEUR Negative 12/09/2024    METHADONEUR Negative 12/09/2024    OPIATEUR Negative 12/09/2024    PCPUR Negative 12/09/2024    THCUR Negative 12/09/2024    OXYCODONEUR Negative 12/09/2024      Lactate INR     No results found for: \"LACTICACID\"   No results found for: \"INR\"   PTT Protime     No results found for: \"PTT\"     No results found for: \"PROTIME\"           Imaging Studies: Results Review Statement: No pertinent imaging studies reviewed.      Counseling / Coordination of Care  Total floor / unit time spent today 45 minutes. Greater than 50% of total time was spent with the patient and / or family counseling and / or coordination of care.           ** Please Note: This note has been constructed using a voice recognition system. **  "
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
## 2024-12-10 NOTE — ASSESSMENT & PLAN NOTE
Drinks 1/2 bottle of vodka daily  Denies H/o withdrawal seizures  H/o non-commanding auditory hallucinations. recent episode 2 days ago   Does not want naltrexone at this time because of experiencing side effects in the past.  Contemplating Acamprosate as an alternative to naltrexone at this time.  Withdrawal management as above  Initiate IVFs, IV thiamine, folic acid, and MVI  Consult case management/CRS for assistance with aftercare resources.

## 2024-12-10 NOTE — ASSESSMENT & PLAN NOTE
Pt with a h/o chronic heavy alcohol use  Was recently admitted to [facility] detox 08/2024 and later discharged to a 21 dys inpatient alcohol rehab at [place]  Relapsed about 60 dys after discharged from rehab  Drinks 1/5th of Vodka daily for the past three weeks and today, drank 6 white claws  Denies H/o withdrawal seizures  Withdrawal management as above  Initiate IVFs, IV thiamine, folic acid, and MVI  Consult case management/CRS for assistance with aftercare resources - pt interested in outpatient drug and alcohol rehab at this time

## 2024-12-10 NOTE — ASSESSMENT & PLAN NOTE
Recent Labs     12/09/24  1821 12/10/24  0429 12/11/24  0505   K 3.2* 3.8 3.5     Repleted, monitor AM lab for improvement

## 2024-12-10 NOTE — DISCHARGE INSTR - OTHER ORDERS
Ping Muniz  Certified     [facility]  [place]  [phone]    AA meeting guide   https://www.aa.org/find-aa    AA Phone apps:   Meeting Guide  Everything AA  In the Rooms    AA/24 hour hotline   [phone]    [facility]  [address]  [place]  [address]  [phone]    [facility]   [address]  [place] [address]  [phone]      [facility]  [address]  [place] [address]  [phone]    [facility]    [facility] organizes meaningful events for people in recovery and their families. Our main goal is to have fun by connecting with nature and other people. We can then realize that there is a world of possibilities open to us, now that we are no longer in the bondage of addiction. These events are designed to provide :  Hope for those in early recovery  Tangible proof that life gets better when we’re sober  Service opportunities for people with recovery experience  Social connectedness for everyone involved    [address]  [place] [address]  Phone: [phone]  Email: [email or web address]   Website: [email or web address]/        Drug and Alcohol Resources in [facility]    If you have health insurance, including medical assistance, there should be a phone number on your insurance card that you can call to find out how to access services. The card may say, “For Behavioral Health Services” or “For Drug and Alcohol Services” or “For Substance Abuse Services” call the number provided. [place] Get Help Now ([phone])    [facility] Drug & Alcohol Division  Located at [address] [place] [address] [place] [address]. [phone]. A  is available Monday through Friday from 8:00 am to 5:00 pm to provide you with assistance on accessing services for substance abuse. If you do not have health insurance and are in financial need, this  office may also help you get the funding for the services that are necessary.   If you are calling after 5:00 pm, on a weekend, or a holiday and need immediate assistance, contact Emergency Services at [phone].  No cost drug and alcohol assessments and Case Management services are available to any resident in need. Please call [phone] or call [phone] and you will be connected with an .    Recovery Centers  [facility] Drug & Alcohol provides funding to support three Recovery Centers in [facility]. These centers offer a safe, sober environment to those in recovery. A variety of programming including 12-Step Meetings, Yoga, Karaoke, Life Skills Workshops, etc. is offered at each location.  [facility]  [address] [place] [address]  [phone]  [email or web address].org [facility]  [address] [place] [address]  [phone]  [email or web address]-vpjd.[email or web address]  [address] [place] [address]  [phone]   [place] [address] [place] [address]  [phone]  [email or web address].[email or web address] [facility]  [address] [place] [address]  [phone]  [facility].org      's Drop In Center    [facility] at [address] in [place], [place] Drop-In Program for veterans. This collaborative programming between Drug & Alcohol,  Affairs, [facility] and [facility] will be held every Friday from 9AM-4PM to assist veterans in their pursuit of their overall wellness and provide them with access to services.   Services available for veterans on Fridays at the [facility] will include peer support groups, professional support groups/MST, assistance with job searches and writing resumes, legal clinics, trainings, VA benefit assistance, on-site Drug & Alcohol assessments and referrals to treatment. A Certified  will also be available on-site.   [facility]  residents struggling with substance abuse can call the Drug and Alcohol Division at [phone]). Veterans can also call [facility] at [phone].    [facility] National Helpline  Confidential free help, from public health agencies, to find substance use treatment and information. [phone]    Link for Zoom Codes for Virtual 12 step Meetings  Https://www.[facility]/HS/[email or web address]/Pages/default.aspx    Outpatient Treatment Providers  [facility]   [address] [place] [address]  Phone: [phone]  [facility]  [address]  [place] [address]  Phone: [phone]  [facility]  [address] [place] [address]  Phone: [phone]  [facility]  [address] [place] [address]  Phone: [phone]  [facility]   [address]  [address]  [place] [address] [phone]

## 2024-12-10 NOTE — ASSESSMENT & PLAN NOTE
anxiety 8/10  depression 8/10  denies active SI but has intermittent passive suicidal thoughts without plan, and only when anxiety levels are high.   has o/p psych f/u  does not want psych consult here, will see her o/p psych after d/c

## 2024-12-10 NOTE — CERTIFIED RECOVERY SPECIALIST
"  Time spent: 1 hour    Consult Rec'd:Yes  Patient seen in: [facility]  Stage of change: Contemplation    Substance used: Alcohol Vodka, White Claws  Frequency/Quantity: Daily / 1/2 bottle and 6 cans  Date of last use: 12/9/24    CRS met with patient to offer support and explain services. Patient was familiar with CRS role and appreciative of the visit. Patient shared openly about her history with alcohol and some of her personal story, has had sober time and been to a few inpatient and outpatient facilities, also has connections in AA community, worked on steps and had a sponsor. Patient shared that she \"doesn't want to live this way anymore and that she will reconnect with AA and explore some other paths of recovery\" as well. CRS and patient had a discussion about the benefits of community connection as soon as patient is able to do so. Patient reported that she misses her relationship with her daughter as well and working from home isn't helping her stay sober but right now she needs to keep her job to pay her bills.     CRS encouraged patient to stay positive and reach out to her support [place] as she rebuilds additional supports.  Patient said she wants to try another outpatient treatment because she can't miss any more work.     CRS and CM discussed patient and collaborated about patient and her history.    CRS team will continue to follow and support, business card and resources provided>                      "

## 2024-12-10 NOTE — UTILIZATION REVIEW
NOTIFICATION OF INPATIENT MEDICAL ADMISSION   AUTHORIZATION REQUEST   SERVICING FACILITY:   [address] [address]  Tax ID: [id number]  NPI: [id number] ATTENDING PROVIDER:  Attending Name and NPI#: Todd Pina Do [[id number]]  Address: [address] [place] [address]  Phone: [phone]     ADMISSION INFORMATION:  Place of Service: Inpatient [facility]  Place of Service Code: 21  Inpatient Admission Date/Time: 12/9/24  8:57 PM  Discharge Date/Time: No discharge date for patient encounter.  Admitting Diagnosis Code/Description:  Acute hypokalemia [E87.6]  Alcohol abuse [F10.10]     UTILIZATION REVIEW CONTACT:  [facility] Utilization   Network Utilization Review Department  Phone: [phone]  Fax [phone]  Email: [email or web address]  Contact for approvals/pending authorizations, clinical reviews, and discharge.     PHYSICIAN ADVISORY SERVICES:  Medical Necessity Denial & Ondz-oe-Jxse Review  Phone: [phone]  Fax: [phone]  Email: [email or web address]     DISCHARGE SUPPORT TEAM:  For Patients Discharge Needs & Updates  Phone: [phone] opt. 2 Fax: [phone]  Email: [email or web address]

## 2024-12-10 NOTE — ASSESSMENT & PLAN NOTE
Patient with a h/o chronic benzodiazepine use for anxiety  Using Klonopin 1 mg BID; recently filled 30 dys prescription on 11/13/2024  Hold medication while undergoing acute treatment for alcohol withdrawal  Denies H/o benzo withdrawal seizures  [facility]   Consult case management/CRS for assistance with aftercare resources - pt interested in outpatient resources  Encourage cessation of benzodiazepine use

## 2024-12-10 NOTE — ASSESSMENT & PLAN NOTE
Recent Labs     12/09/24  1821 12/10/24  0429   PHOS 2.1* 2.5*     Repleted, monitor AM lab for improvement

## 2024-12-10 NOTE — CONSULTS
Consultation - Medical Toxicology   Name: Mckenna Brown 39 y.o. female I MRN: [id number]  Unit/Bed#: 5T DETOX 505-01 I Date of Admission: 12/9/2024   Date of Service: 12/10/2024 I Hospital Day: 1       Inpatient consult to Toxicology     Date/Time  12/10/2024 8:04 AM     Performed by  Reynaldo Tovar MD   Authorized by  Emmie Hobson PA-C           Physician Requesting Evaluation: Todd Pina DO   Reason for Evaluation / Principal Problem: Alcohol withdrawal, alcohol use disorder    Assessment & Plan  Alcohol withdrawal syndrome with complication (HCC)  The patient is experiencing acute alcohol withdrawal symptoms, including anxiety, tremors, nausea, with a SEWS score of 11 on admission.  History of severe alcohol use disorder with recent relapse after previous inpatient rehabilitation.  Chronic daily alcohol consumption, primarily vodka, with recent increased intake.  Drinks 1/2 bottle of vodka daily  Last drink: on 12/09/2024 at around 3:00 PM  Ethanol lvl: 237  No history of withdrawal seizures.  H/o non-commanding auditory hallucinations.  Denies recent opioid use.  Follow SEWS protocol with symptom-triggered phenobarbital for medically-assisted alcohol withdrawal  Administered thus far 714.8 mg phenobarbital + valium 10 mg IV   Continuous pulse ox or telemetry monitoring   Continue to monitor vitals, symptoms   Alcohol use disorder, severe, dependence (HCC)  Drinks 1/2 bottle of vodka daily  Denies H/o withdrawal seizures  H/o non-commanding auditory hallucinations. recent episode 2 days ago   Does not want naltrexone at this time because of experiencing side effects in the past.  Contemplating Acamprosate as an alternative to naltrexone at this time.  Withdrawal management as above  Initiate IVFs, IV thiamine, folic acid, and MVI  Consult case management/CRS for assistance with aftercare resources.  Benzodiazepine dependence (HCC)  Encourage cessation of benzodiazepine use.  Consult case  management for outpatient resources to support cessation efforts.  Monitor for any signs of benzodiazepine withdrawal during current treatment.    Anxiety and depression  anxiety 8/10  depression 8/10  denies active SI but has intermittent passive suicidal thoughts without plan, and only when anxiety levels are high.   has o/p psych f/u  does not want psych consult here, will see her o/p psych after d/c  Hypokalemia    Hypophosphatemia    Pharyngitis    I have discussed the above management plan in detail with the primary service.       For further questions, please contact the medical  on call via SecureChat between 8 am and 9 pm. If between 9 pm and 8 am, please reach out to the Poison Center at [phone].     History of Present Illness   Mckenna Brown is a 39 y.o. year old female with a significant history of anxiety, depression, and severe alcohol use disorder,who presents for detoxification from alcohol. The patient reported a history of chronic daily alcohol consumption, primarily vodka, with a recent pattern of drinking approximately one-fifth of vodka daily over the past three weeks. On the day of presentation, she consumed six white claws as well. Her last alcohol intake was at 3 PM on 12/09/2024.  Ms. Brown has a history of previous inpatient and outpatient treatments for alcohol withdrawal, including a recent admission to [facility] in August 2024, followed by a 21-day inpatient rehabilitation program at [place]. Despite these interventions, she relapsed approximately 60 days after discharge and has since been unable to maintain sobriety, experiencing multiple failed attempts at self-detoxification.  The patient states that she was experiencing withdrawal symptoms, including anxiety, tremors, nausea, vomiting, and increased anxiety and decided to seek medical help. Her SEWS (Symptom-triggered Ethanol Withdrawal Scale) score upon admission was 11, which prompted  initiation of the [facility] protocol for medical management of alcohol withdrawal. Initial treatment included 260 mg of Phenobarbital and 10 mg of Valium intravenously. Continuous monitoring via pulse oximetry and telemetry has been implemented.  Ms. Brown denies any history of withdrawal seizures, visual hallucinations, chest pain, shortness of breath, or abdominal pain. Has a history of auditory hallucinations, with recent one being experienced two days ago. She is interested in pursuing outpatient rehabilitation resources following stabilization. Additionally, she has a history of benzodiazepine use, specifically Klonopin, which is currently held while she undergoes treatment for alcohol withdrawal under the [facility] protocol. She denies any co-existing substance use disorders, although she has a history of using marijuana and benzodiazepines.  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 A monthly injection of Naltrexone will be considered once she is stabilized to help reduce alcohol cravings. Case management has been consulted to assist with coordination of aftercare resources, and the patient has expressed interest in outpatient drug and alcohol rehabilitation.      Review of Systems   Constitutional:  Positive for appetite change, chills, diaphoresis and fatigue.   HENT:  Positive for sore throat and trouble swallowing. Negative for mouth sores.    Eyes:  Positive for photophobia.   Respiratory:  Negative for shortness of breath.    Cardiovascular:  Positive for palpitations. Negative for chest pain and leg swelling.   Gastrointestinal:  Positive for diarrhea, nausea and vomiting. Negative for anal bleeding and blood in stool.   Genitourinary: Negative.    Musculoskeletal:  Positive for myalgias.   Skin: Negative.    Neurological:  Positive for tremors, weakness and headaches. Negative for seizures, syncope, speech difficulty and numbness.    Psychiatric/Behavioral:  Positive for hallucinations and suicidal ideas (intermittent passive suicidal thoughts w/o plan.). Negative for agitation and confusion.    All other systems reviewed and are negative.      Historical Information   I have reviewed the patient's PMH, PSH, Social History, Family History, Meds, and Allergies  Social History     Tobacco Use    Smoking status: Never    Smokeless tobacco: Never   Vaping Use    Vaping status: Never Used   Substance and Sexual Activity    Alcohol use: Yes     Comment: 1/2 bottle vodka/day    Drug use: Yes     Types: Marijuana, Benzodiazepines    Sexual activity: Not on file     History reviewed. No pertinent family history.    Meds/Allergies   Prior to Admission medications    Medication Sig Start Date End Date Taking? Authorizing Provider   clonazePAM (KlonoPIN) 1 mg tablet Take 1 tablet (1 mg total) by mouth 2 (two) times a day for 14 days 8/21/24 9/4/24  Vinay Baer PA-C   escitalopram (LEXAPRO) 20 mg tablet Take 1 tablet (20 mg total) by mouth daily for 14 days 8/21/24 9/4/24  Vinay Baer PA-C   gabapentin (NEURONTIN) 300 mg capsule Take 1 capsule (300 mg total) by mouth daily at bedtime for 14 days  Patient taking differently: Take 600 mg by mouth daily at bedtime 8/21/24 9/4/24  Vinay Baer PA-C   prazosin (MINIPRESS) 1 mg capsule Take 1 capsule (1 mg total) by mouth daily at bedtime for 14 days  Patient taking differently: Take 2 mg by mouth daily at bedtime 8/21/24 9/4/24  Vinay Baer PA-C     Current Facility-Administered Medications:     acetaminophen (TYLENOL) tablet 650 mg, 650 mg, Oral, Q6H PRN, Emmie Hobson PA-C    diphenhydramine, lidocaine, Al/Mg hydroxide, simethicone (Magic Mouthwash) oral solution 10 mL, 10 mL, Oral, TID Todd HANLEY DO    enoxaparin (LOVENOX) subcutaneous injection 40 mg, 40 mg, Subcutaneous, Daily, Emmie Hobson PA-C    escitalopram (LEXAPRO) tablet 20 mg, 20 mg, Oral, Daily,  Emmie Hobson PA-C, 20 mg at 12/10/24 0826    famotidine (PEPCID) tablet 20 mg, 20 mg, Oral, BID, Todd Pina DO, 20 mg at 12/10/24 0925    folic acid 1 mg, thiamine (VITAMIN B1) 100 mg in sodium chloride 0.9 % 100 mL IV piggyback, , Intravenous, Daily, Emmie Hobson PA-C, Held at 12/10/24 0925    gabapentin (NEURONTIN) capsule 300 mg, 300 mg, Oral, Q8H PRN, Emmie Hobson PA-C, 300 mg at 12/10/24 0416    multivitamin-minerals (CENTRUM) tablet 1 tablet, 1 tablet, Oral, Daily, Emmie Hobson PA-C, 1 tablet at 12/10/24 0826    ondansetron (ZOFRAN) injection 4 mg, 4 mg, Intravenous, Q6H PRN, Emmie Hobson PA-C    potassium-sodium phosphateS (K-PHOS,PHOSPHA 250) -250 mg tablet 1 tablet, 1 tablet, Oral, 4x Daily (with meals and at bedtime), Narendra Montez MD, 1 tablet at 12/10/24 0826    prazosin (MINIPRESS) capsule 2 mg, 2 mg, Oral, HS, Todd Pina, DO    QUEtiapine (SEROquel) tablet 50 mg, 50 mg, Oral, HS, Todd Pina,     sodium chloride 0.9 % infusion, 125 mL/hr, Intravenous, Continuous, Emmie Hobson PA-C, Last Rate: 125 mL/hr at 12/09/24 2221, 125 mL/hr at 12/09/24 2221    traZODone (DESYREL) tablet 50 mg, 50 mg, Oral, HS PRN, Emmie Hobson PA-C   No Known Allergies    Objective :  Temp:  [97.2 °F (36.2 °C)-98.9 °F (37.2 °C)] 97.7 °F (36.5 °C)  HR:  [56-94] 90  BP: (120-152)/(89-96) 152/96  Resp:  [16-18] 18  SpO2:  [93 %-99 %] 98 %  O2 Device: None (Room air)      Intake/Output Summary (Last 24 hours) at 12/10/2024 1028  Last data filed at 12/10/2024 0501  Gross per 24 hour   Intake 1480 ml   Output --   Net 1480 ml       Physical Exam  Vitals and nursing note reviewed.   Constitutional:       General: She is not in acute distress.     Appearance: Normal appearance. She is not toxic-appearing or diaphoretic.   HENT:      Head: Normocephalic and atraumatic.      Nose: Nose normal.      Mouth/Throat:      Mouth: Mucous membranes  are moist.      Pharynx: Posterior oropharyngeal erythema present.   Eyes:      Extraocular Movements: Extraocular movements intact.      Pupils: Pupils are equal, round, and reactive to light.   Cardiovascular:      Rate and Rhythm: Normal rate and regular rhythm.      Pulses: Normal pulses.      Heart sounds: Normal heart sounds.   Pulmonary:      Effort: Pulmonary effort is normal.      Breath sounds: Normal breath sounds.   Abdominal:      General: Abdomen is flat.      Palpations: Abdomen is soft.      Tenderness: There is abdominal tenderness (mild generalised non-localized). There is no guarding or rebound.   Musculoskeletal:      Cervical back: Normal range of motion and neck supple.   Skin:     General: Skin is warm and dry.      Capillary Refill: Capillary refill takes less than 2 seconds.   Neurological:      General: No focal deficit present.      Mental Status: She is alert and oriented to person, place, and time.   Psychiatric:         Attention and Perception: Attention and perception normal.         Mood and Affect: Mood is anxious and depressed.         Speech: Speech normal.         Behavior: Behavior normal. Behavior is not agitated or aggressive. Behavior is cooperative.         Thought Content: Thought content normal. Thought content is not paranoid or delusional. Thought content does not include homicidal ideation. Suicidal: fleeting passive suicidal thoughts w/o plan.Thought content does not include homicidal or suicidal plan.           Lab Results: I have reviewed the following results:  Results from last 7 days   Lab Units 12/10/24  0429 12/09/24  1821   WBC Thousand/uL 6.16 6.13   HEMOGLOBIN g/dL 10.8* 12.9   HEMATOCRIT % 32.3* 38.1   PLATELETS Thousands/uL 138* 177   SEGS PCT %  --  45   LYMPHO PCT %  --  42   MONO PCT %  --  9   EOS PCT %  --  3      Results from last 7 days   Lab Units 12/09/24  1821   POTASSIUM mmol/L 3.2*   CHLORIDE mmol/L 104   CO2 mmol/L 26   BUN mg/dL 3*    CREATININE mg/dL 0.58*   CALCIUM mg/dL 8.4   ALBUMIN g/dL 4.7   ALK PHOS U/L 52   ALT U/L 12   AST U/L 28   MAGNESIUM mg/dL 2.6   PHOSPHORUS mg/dL 2.1*                       Results from last 7 days   Lab Units 12/09/24  1821   ETHANOL LVL mg/dL 237*     Imaging Results Review: I reviewed radiology reports from this admission including: Echocardiogram.  Other Study Results Review: EKG was reviewed.     Administrative Statements   I have spent a total time of 34 minutes in caring for this patient on the day of the visit/encounter including Diagnostic results, Risks and benefits of tx options, Patient and family education, Importance of tx compliance, Risk factor reductions, Counseling / Coordination of care, Documenting in the medical record, Reviewing / ordering tests, medicine, procedures  , Obtaining or reviewing history  , and Communicating with other healthcare professionals .

## 2024-12-10 NOTE — ED NOTES
Pt given ice water and a warm blanket. Call bell within reach, no other needs at this time.      Pam Coronado RN  12/09/24 1955

## 2024-12-10 NOTE — UTILIZATION REVIEW
"Initial Clinical Review    Pt presented to [facility] for its Level IV medically managed intensive inpatient detox unit.    Admission: Date/Time/Statement:   Admission Orders (From admission, onward)       Ordered        12/09/24 2057  INPATIENT ADMISSION  Once                          Orders Placed This Encounter   Procedures    INPATIENT ADMISSION     Standing Status:   Standing     Number of Occurrences:   1     Level of Care:   Med Surg [16]     Estimated length of stay:   More than 2 Midnights     Certification:   I certify that inpatient services are medically necessary for this patient for a duration of greater than two midnights. See H&P and MD Progress Notes for additional information about the patient's course of treatment.     ED Arrival Information       Expected   -    Arrival   12/9/2024 17:33    Acuity   Urgent              Means of arrival   Walk-In    Escorted by   Self    Service   Hospitalist    Admission type   Emergency              Arrival complaint   Alcohol Detox             Chief Complaint   Patient presents with    Detox Evaluation     Pt looking to detox from drinking alcohol. States she drinks 1/2 bottle of vodka daily. \"In detox for the same recently but relapsed three weeks ago.\" Denies withdrawal seizures.        Initial Presentation: 39 y.o. female who presented to [facility]. Inpatient admission for evaluation and treatment of electrolyte imbalances & alcohol withdrawal syndrome. Presented w/ request for detox. Reports a fifth of vodka daily, last drink on 12/9 @ 1500. Exam: nausea, anxiety. Phos 2.1, K 3.2. CIWA 4. Plan: thiamine/folic acid supplements, IVF, telemetry, continuous pulse ox, continue PTA meds except Klonopin, trend labs, replete electrolytes as needed; I&O, fall & seizure precautions. Toxicology consulted.     Anticipated Length of Stay: Patient will be admitted on an Inpatient basis with an anticipated length of stay of  2-3  " midnights.   Justification for Hospital Stay: Alcohol withdrawal syndrome       Date: 12/10/24       Day 2: On exam, anxiety, tremors, tachycardic, n/v, tongue fasciculation. SEWS 5. Plan: continue SEWS monitoring w/ phenobarbital management, IV thiamine/folic acid supplement, telemetry, continuous pulse ox, continue current meds, trend labs, replete electrolytes as needed. Received 715 mg phenobarbital since admission.     Toxicology: Presented w/ need for detox from alcohol. Serum ETOH: 237. Has prior rehab treatment for withdrawal. Reports no hx of withdrawal seizures. Plan: SEWS monitoring w/ phenobarbital management, IV thiamine/folic acid supplement.     ED Treatment-Medication Administration from 12/09/2024 1732 to 12/09/2024 2143         Date/Time Order Dose Route Action     12/09/2024 1822 sodium chloride 0.9 % bolus 1,000 mL 1,000 mL Intravenous New Bag     12/09/2024 1822 thiamine (VITAMIN B1) 100 mg in sodium chloride 0.9 % 50 mL IVPB 100 mg Intravenous New Bag     12/09/2024 1829 ondansetron (ZOFRAN) injection 4 mg 4 mg Intravenous Given     12/09/2024 2109 potassium chloride (Klor-Con M20) CR tablet 40 mEq 40 mEq Oral Given            Scheduled Medications:  enoxaparin, 40 mg, Subcutaneous, Daily  escitalopram, 20 mg, Oral, Daily  folic acid 1 mg, thiamine (VITAMIN B1) 100 mg in sodium chloride 0.9 % 100 mL IV piggyback, , Intravenous, Daily  multivitamin-minerals, 1 tablet, Oral, Daily  potassium-sodium phosphateS, 1 tablet, Oral, 4x Daily (with meals and at bedtime)    Continuous IV Infusions:  sodium chloride, 125 mL/hr, Intravenous, Continuous    PRN Meds:  acetaminophen, 650 mg, Oral, Q6H PRN  gabapentin, 300 mg, Oral, Q8H PRN; 12/10 x1  ondansetron, 4 mg, Intravenous, Q6H PRN; 12/9 x1  traZODone, 50 mg, Oral, HS PRN  diazepam, 10 mg, Intravenous; 12/9 x1  phenobarbital, 260 mg, Intravenous; 12/9 x1  phenobarbital, 130 mg, Intravenous; 12/9 x1, 12/10 x2  phenobarbital, 64.8 mg, Oral; 12/10  x1  potassium chloride, 20 mEq, Intravenous; 12/9 x1    ED Triage Vitals   Temperature Pulse Respirations Blood Pressure SpO2 Pain Score   12/09/24 1800 12/09/24 1800 12/09/24 1800 12/09/24 1800 12/09/24 1800 12/09/24 2259   98.9 °F (37.2 °C) 94 18 120/96 94 % No Pain     Weight (last 2 days)       Date/Time Weight    12/09/24 2153 61.9 (136.47)    12/09/24 1800 61.9 (136.5)              Vital Signs (last 3 days)       Date/Time Temp Pulse Resp BP MAP (mmHg) SpO2 O2 Device GCS CIWA-Ar Total SEWS Total Score Pain    12/10/24 0714 97.7 °F (36.5 °C) 90 18 152/96 114 98 % None (Room air) -- -- -- --    12/10/24 0527 -- -- -- -- -- -- -- -- -- 5 --    12/10/24 0526 98.1 °F (36.7 °C) 56 18 132/90 -- 93 % None (Room air) -- -- -- --    12/10/24 0418 -- -- -- -- -- -- -- -- -- 8 --    12/10/24 0415 97.6 °F (36.4 °C) 87 17 139/89 105 97 % None (Room air) -- -- -- --    12/10/24 0159 -- -- -- -- -- -- -- -- -- 11 --    12/10/24 0158 98 °F (36.7 °C) 74 17 124/94 -- 97 % None (Room air) -- -- -- --    12/09/24 2305 -- -- -- -- -- -- -- -- -- 9 --    12/09/24 2300 97.3 °F (36.3 °C) 80 16 134/91 105 99 % None (Room air) -- -- -- --    12/09/24 2259 -- -- -- -- -- -- -- -- -- -- No Pain    12/09/24 2256 -- -- -- -- -- -- -- 15 -- -- --    12/09/24 2214 -- -- -- -- -- 99 % None (Room air) -- -- -- --    12/09/24 2154 -- -- -- -- -- -- -- -- -- 11 --    12/09/24 2153 97.2 °F (36.2 °C) 82 16 134/96 -- 96 % None (Room air) -- -- -- --    12/09/24 1833 -- -- -- -- -- -- -- 15 3 -- --    12/09/24 1832 -- -- -- -- -- -- None (Room air) -- -- -- --    12/09/24 1800 98.9 °F (37.2 °C) 94 18 120/96 -- 94 % None (Room air) -- -- -- --            SEWS:   Row Name 12/10/24 0527 12/10/24 0418 12/10/24 0159 12/09/24 2305 12/09/24 2154   Severity of Ethanol Withdrawal Scale (SEWS)   ANXIETY: Do you feel that something bad is about to happen to you right now? 3  -TS 3  -JA 3  -TS 3  -TS 3  -TS   NAUSEA and DRY HEAVES or VOMITING? 0  -TS 0  -JA 3   -TS 3  -TS 3  -TS   SWEATING: (includes moist palms, sweating now)? Score 0 or 2 0  -TS 0  -JA 0  -TS 0  -TS 0  -TS   TREMOR: with arms extended eyes closed? 2  -TS 2  -JA 2  -TS 0  -TS 2  -TS   AGITATION: Fidgety, restless, pacing? 0  -TS 0  -JA 0  -TS 0  -TS 0  -TS   DISORIENTATION: 0  -TS 0  -JA 0  -TS 0  -TS 0  -TS   HALLUCINATIONS: 0  -TS 0  -JA 0  -TS 0  -TS 0  -TS   VITAL SIGNS: ANY (Pulse >110, Diastolic BP >90, Temp >99.6) 0  -TS 3  -JA 3  -TS 3  -TS 3  -TS   SEWS Total Score 5  -TS 8  -JA 11  -TS 9  -TS 11  -TS       Pertinent Labs/Diagnostic Test Results:   Results from last 7 days   Lab Units 12/10/24  0429 12/09/24  1821   WBC Thousand/uL 6.16 6.13   HEMOGLOBIN g/dL 10.8* 12.9   HEMATOCRIT % 32.3* 38.1   PLATELETS Thousands/uL 138* 177   TOTAL NEUT ABS Thousands/µL  --  2.75         Results from last 7 days   Lab Units 12/09/24  1821   SODIUM mmol/L 140   POTASSIUM mmol/L 3.2*   CHLORIDE mmol/L 104   CO2 mmol/L 26   ANION GAP mmol/L 10   BUN mg/dL 3*   CREATININE mg/dL 0.58*   EGFR ml/min/1.73sq m 115   CALCIUM mg/dL 8.4   MAGNESIUM mg/dL 2.6   PHOSPHORUS mg/dL 2.1*     Results from last 7 days   Lab Units 12/09/24  1821   AST U/L 28   ALT U/L 12   ALK PHOS U/L 52   TOTAL PROTEIN g/dL 8.2   ALBUMIN g/dL 4.7   TOTAL BILIRUBIN mg/dL 0.42         Results from last 7 days   Lab Units 12/09/24  1821   GLUCOSE RANDOM mg/dL 117     Results from last 7 days   Lab Units 12/09/24  1821   LIPASE u/L 27     Results from last 7 days   Lab Units 12/09/24  1824   AMPH/METH  Negative   BARBITURATE UR  Negative   BENZODIAZEPINE UR  Negative   COCAINE UR  Negative   METHADONE URINE  Negative   OPIATE UR  Negative   PCP UR  Negative   THC UR  Negative     Results from last 7 days   Lab Units 12/09/24  1821   ETHANOL LVL mg/dL 237*         Past Medical History:   Diagnosis Date    Anxiety     Migraine      Present on Admission:   Anxiety and depression   Benzodiazepine dependence (HCC)   Alcohol withdrawal syndrome with  complication (HCC)      Admitting Diagnosis: Acute hypokalemia [E87.6]  Alcohol abuse [F10.10]  Age/Sex: 39 y.o. female    Network Utilization Review Department  ATTENTION: Please call with any questions or concerns to [phone] and carefully listen to the prompts so that you are directed to the right person. All voicemails are confidential.   For Discharge needs, contact Care Management DC Support Team at [phone] opt. 2  Send all requests for admission clinical reviews, approved or denied determinations and any other requests to dedicated fax number below belonging to the campus where the patient is receiving treatment. List of dedicated fax numbers for the Facilities:  FACILITY NAME UR FAX NUMBER   ADMISSION DENIALS (Administrative/Medical Necessity) [phone]   DISCHARGE SUPPORT TEAM (NETWORK) [phone]   PARENT CHILD HEALTH (Maternity/NICU/Pediatrics) [phone]   [facility] [phone]   [facility] [phone]   [facility] [phone]   [facility] [phone]   [facility] [phone]   [facility] [phone]   [facility] [phone]   [facility] [phone]   [facility] [phone]   [facility] [phone]   [facility] [phone]   [facility] [phone]

## 2024-12-10 NOTE — ASSESSMENT & PLAN NOTE
Encourage cessation of benzodiazepine use.  Consult case management for outpatient resources to support cessation efforts.  Monitor for any signs of benzodiazepine withdrawal during current treatment.

## 2024-12-11 VITALS
RESPIRATION RATE: 18 BRPM | HEIGHT: 66 IN | HEART RATE: 97 BPM | DIASTOLIC BLOOD PRESSURE: 86 MMHG | WEIGHT: 136.47 LBS | SYSTOLIC BLOOD PRESSURE: 137 MMHG | BODY MASS INDEX: 21.93 KG/M2 | TEMPERATURE: 97.5 F | OXYGEN SATURATION: 97 %

## 2024-12-11 LAB
ANION GAP SERPL CALCULATED.3IONS-SCNC: 8 MMOL/L (ref 4–13)
BUN SERPL-MCNC: <2 MG/DL (ref 5–25)
CALCIUM SERPL-MCNC: 7.6 MG/DL (ref 8.4–10.2)
CHLORIDE SERPL-SCNC: 103 MMOL/L (ref 96–108)
CO2 SERPL-SCNC: 26 MMOL/L (ref 21–32)
CREAT SERPL-MCNC: 0.55 MG/DL (ref 0.6–1.3)
GFR SERPL CREATININE-BSD FRML MDRD: 117 ML/MIN/1.73SQ M
GLUCOSE SERPL-MCNC: 86 MG/DL (ref 65–140)
POTASSIUM SERPL-SCNC: 3.5 MMOL/L (ref 3.5–5.3)
SODIUM SERPL-SCNC: 137 MMOL/L (ref 135–147)

## 2024-12-11 PROCEDURE — 99239 HOSP IP/OBS DSCHRG MGMT >30: CPT | Performed by: INTERNAL MEDICINE

## 2024-12-11 PROCEDURE — 80048 BASIC METABOLIC PNL TOTAL CA: CPT

## 2024-12-11 PROCEDURE — 99232 SBSQ HOSP IP/OBS MODERATE 35: CPT | Performed by: EMERGENCY MEDICINE

## 2024-12-11 RX ORDER — CLONAZEPAM 1 MG/1
1 TABLET ORAL 2 TIMES DAILY
Status: DISCONTINUED | OUTPATIENT
Start: 2024-12-11 | End: 2024-12-11 | Stop reason: HOSPADM

## 2024-12-11 RX ORDER — FOLIC ACID 1 MG/1
1 TABLET ORAL DAILY
Qty: 30 TABLET | Refills: 0 | Status: SHIPPED | OUTPATIENT
Start: 2024-12-12

## 2024-12-11 RX ORDER — LANOLIN ALCOHOL/MO/W.PET/CERES
100 CREAM (GRAM) TOPICAL DAILY
Qty: 30 TABLET | Refills: 0 | Status: SHIPPED | OUTPATIENT
Start: 2024-12-12

## 2024-12-11 RX ORDER — FAMOTIDINE 20 MG/1
20 TABLET, FILM COATED ORAL 2 TIMES DAILY
Qty: 60 TABLET | Refills: 0 | Status: SHIPPED | OUTPATIENT
Start: 2024-12-11

## 2024-12-11 RX ADMIN — FAMOTIDINE 20 MG: 20 TABLET, FILM COATED ORAL at 08:54

## 2024-12-11 RX ADMIN — CLONAZEPAM 1 MG: 1 TABLET ORAL at 13:16

## 2024-12-11 RX ADMIN — DIBASIC SODIUM PHOSPHATE, MONOBASIC POTASSIUM PHOSPHATE AND MONOBASIC SODIUM PHOSPHATE 1 TABLET: 852; 155; 130 TABLET ORAL at 11:56

## 2024-12-11 RX ADMIN — THIAMINE HCL TAB 100 MG 100 MG: 100 TAB at 08:54

## 2024-12-11 RX ADMIN — MULTIPLE VITAMINS W/ MINERALS TAB 1 TABLET: TAB ORAL at 08:54

## 2024-12-11 RX ADMIN — ENOXAPARIN SODIUM 40 MG: 40 INJECTION SUBCUTANEOUS at 08:54

## 2024-12-11 RX ADMIN — FOLIC ACID 1 MG: 1 TABLET ORAL at 08:54

## 2024-12-11 RX ADMIN — DIBASIC SODIUM PHOSPHATE, MONOBASIC POTASSIUM PHOSPHATE AND MONOBASIC SODIUM PHOSPHATE 1 TABLET: 852; 155; 130 TABLET ORAL at 08:53

## 2024-12-11 RX ADMIN — DIPHENHYDRAMINE HYDROCHLORIDE AND LIDOCAINE HYDROCHLORIDE AND ALUMINUM HYDROXIDE AND MAGNESIUM HYDRO 10 ML: KIT at 06:17

## 2024-12-11 RX ADMIN — ESCITALOPRAM OXALATE 20 MG: 10 TABLET ORAL at 08:54

## 2024-12-11 RX ADMIN — DIPHENHYDRAMINE HYDROCHLORIDE AND LIDOCAINE HYDROCHLORIDE AND ALUMINUM HYDROXIDE AND MAGNESIUM HYDRO 10 ML: KIT at 11:56

## 2024-12-11 NOTE — PROGRESS NOTES
CM met with Pt to discuss disposition plans.  Pt verbalizes agreement to attending OP DAVID tx with [facility] to increase chances of maintaining sobriety.  MALISSA signed.  CM attempted to schedule appt but STR request that Pt call to complete phone screening  before an intake appt is scheduled.  Phone number provided to Pt for completion of phone screening.  Per STR appt will be provided to Pt after screening.

## 2024-12-11 NOTE — ASSESSMENT & PLAN NOTE
improved and minimal today  has o/p psych f/u  does not want psych consult here, will see her o/p psych after d/c

## 2024-12-11 NOTE — ASSESSMENT & PLAN NOTE
8/24/20 Patient: Helena Ortega YOB: 1983 Date of Visit: 8/13/2020 Guera Ibarra MD 
[facility] [facility] [address] [facility] [address] [place] Dear Guera Ibarra MD, Thank you for referring Ms. Helena Ortega to [address] [place] [address] for evaluation. My notes for this consultation are attached. If you have questions, please do not hesitate to call me. I look forward to following your patient along with you. Sincerely, Karrie Zavala MD 
 
 Mood stable continue prazosin quetiapine escitalopram and clonazepam as previously taken

## 2024-12-11 NOTE — ASSESSMENT & PLAN NOTE
On admission:   The patient was admitted with acute alcohol withdrawal symptoms, including anxiety, tremors, nausea, with a SEWS score of 11 on admission.  History of severe alcohol use disorder with recent relapse after previous inpatient rehabilitation.  Chronic daily alcohol consumption, primarily vodka, with recent increased intake.  Drinks 1/2 bottle of vodka daily  Last drink: on 12/09/2024 at around 3:00 PM  Ethanol lvl: 237  No history of withdrawal seizures.  H/o non-commanding auditory hallucinations.  Denied recent opioid use.    Today:    Is in no acute distress today. Reports improvement of her condition.  Denies termors, fever,chills, diaphoresis  c/o fatigue, diarrhea  Administered thus far 844.8 mg phenobarbital  SEWS: 0 (x 4 times in a row)  Discontinue SEWS protocol as the pt it withdrawal symptom-free  The patient is clear from toxicology stand point.  Toxicology signs off at this time.

## 2024-12-11 NOTE — PROGRESS NOTES
Progress Note - Medical Toxicology   Name: Mckenna Brown 39 y.o. female I MRN: [id number]  Unit/Bed#: 5T DETOX 505-01 I Date of Admission: 12/9/2024   Date of Service: 12/11/2024 I Hospital Day: 2    Assessment & Plan  Alcohol withdrawal syndrome with complication (HCC)  On admission:   The patient was admitted with acute alcohol withdrawal symptoms, including anxiety, tremors, nausea, with a SEWS score of 11 on admission.  History of severe alcohol use disorder with recent relapse after previous inpatient rehabilitation.  Chronic daily alcohol consumption, primarily vodka, with recent increased intake.  Drinks 1/2 bottle of vodka daily  Last drink: on 12/09/2024 at around 3:00 PM  Ethanol lvl: 237  No history of withdrawal seizures.  H/o non-commanding auditory hallucinations.  Denied recent opioid use.    Today:    Is in no acute distress today. Reports improvement of her condition.  Denies termors, fever,chills, diaphoresis  c/o fatigue, diarrhea  Administered thus far 844.8 mg phenobarbital  SEWS: 0 (x 4 times in a row)  Discontinue SEWS protocol as the pt it withdrawal symptom-free  The patient is clear from toxicology stand point.  Toxicology signs off at this time.    Alcohol use disorder, severe, dependence (HCC)  Drinks 1/2 bottle of vodka daily  Denies H/o withdrawal seizures  H/o non-commanding auditory hallucinations. recent episode 2 days ago   After education given, the pt is contemplating treatment with Naltrexone, after initially refusing it yesterday.  If decides to go with naltrexone PO, give naltrexone 25 mg PO x 1 and observe for side effects. If tolerates well, titrate up to 50 mg PO daily.  If pt decides to go with naltrexone  HERNANDEZ: administer an initial 25 mg oral dose of naltrexone to assess tolerance to the medication before proceeding with the long acting naltrexone injection .  Benzodiazepine dependence (HCC)  Encourage cessation of benzodiazepine use.  Consult case management for  "outpatient resources to support cessation efforts.  Monitor for any signs of benzodiazepine withdrawal during current treatment.    Anxiety and depression  improved and minimal today  has o/p psych f/u  does not want psych consult here, will see her o/p psych after d/c  Hypokalemia    Hypophosphatemia    Pharyngitis      Subjective   The patient seems to be at bedside, she is calm cooperative, is in no acute distress, reports improvement of her symptoms, denies tremors, diaphoresis, reports improvement in her anxiety and depression, and currently  her major complaint is fatigue.  The patient continues to have photophobia which she says is her baseline and is one of the symptoms of her migraine headaches, but she denies having acute episode of migraine at present time.  The patient reports GI symptoms of dyspepsia, saying \"i cannot hold anything after I eat\" and \"need to go to bathroom several times after each meal\". She has a fragmented sleep at night, woke up several times  due to diarrhea, which per patient is non bloody. No Sx of acute abdomen.   Denies chest pain, SOB, fever, chills, pain.      Objective :  Temp:  [97 °F (36.1 °C)-99.4 °F (37.4 °C)] 99.4 °F (37.4 °C)  HR:  [] 97  BP: (101-137)/(59-86) 114/75  Resp:  [16-18] 16  SpO2:  [96 %-99 %] 97 %  O2 Device: None (Room air)    No intake or output data in the 24 hours ending 12/11/24 1044    Physical Exam  Vitals and nursing note reviewed.   Constitutional:       General: She is not in acute distress.     Appearance: Normal appearance. She is not toxic-appearing or diaphoretic.   HENT:      Head: Normocephalic and atraumatic.      Nose: Nose normal.      Mouth/Throat:      Mouth: Mucous membranes are moist.      Pharynx: Oropharynx is clear.   Eyes:      Extraocular Movements: Extraocular movements intact.      Pupils: Pupils are equal, round, and reactive to light.   Cardiovascular:      Rate and Rhythm: Normal rate and regular rhythm.      Pulses: " Normal pulses.      Heart sounds: Normal heart sounds.   Pulmonary:      Effort: Pulmonary effort is normal.      Breath sounds: Normal breath sounds.   Abdominal:      General: Abdomen is flat.      Palpations: Abdomen is soft.      Tenderness: There is no guarding or rebound.   Musculoskeletal:         General: Normal range of motion.   Skin:     General: Skin is warm and dry.      Capillary Refill: Capillary refill takes less than 2 seconds.   Neurological:      General: No focal deficit present.      Mental Status: She is alert and oriented to person, place, and time.   Psychiatric:         Mood and Affect: Mood normal.         Behavior: Behavior normal.           Lab Results: I have reviewed the following results:          Administrative Statements

## 2024-12-11 NOTE — ASSESSMENT & PLAN NOTE
History of anxiety and alcohol use presented for alcohol withdrawal symptoms.    She had a relapse and was consuming vodka  Patient was placed on SEWS protocol and did well.  No further withdrawal symptoms  Was offered naltrexone patient declined  Discharge: Outpatient services

## 2024-12-11 NOTE — PROGRESS NOTES
12/10/24 1536   Housing Stability   In the last 12 months, was there a time when you were not able to pay the mortgage or rent on time? N   In the past 12 months, how many times have you moved where you were living? 1   At any time in the past 12 months, were you homeless or living in a shelter (including now)? N   Transportation Needs   In the past 12 months, has lack of transportation kept you from medical appointments or from getting medications? no   In the past 12 months, has lack of transportation kept you from meetings, work, or from getting things needed for daily living? No   Food Insecurity   Within the past 12 months, you worried that your food would run out before you got the money to buy more. Never true   Within the past 12 months, the food you bought just didn't last and you didn't have money to get more. Never true   Intimate Partner Violence   Within the last year, have you been afraid of your partner or ex-partner? No   Within the last year, have you been humiliated or emotionally abused in other ways by your partner or ex-partner? No   Within the last year, have you been kicked, hit, slapped, or otherwise physically hurt by your partner or ex-partner? No   Within the last year, have you been raped or forced to have any kind of sexual activity by your partner or ex-partner? No   Alcohol Use   Q1: How often do you have a drink containing alcohol? 4 or more ti   Q2: How many drinks containing alcohol do you have on a typical day when you are drinking? 10 or more   Q3: How often do you have six or more drinks on one occasion? Daily   Utilities   In the past 12 months has the electric, gas, oil, or water company threatened to shut off services in your home? No   Health Literacy   How often do you need to have someone help you when you read instructions, pamphlets, or other written material from your doctor or pharmacy? Never   Follow-Ups   We make community resources available to all of our patients  to assist with everyday needs. We may be able to connect you with those resources. Would you be interested? N

## 2024-12-11 NOTE — CERTIFIED RECOVERY SPECIALIST
Certified  Note    Patient name: Mckenna Brown  Location: 5T DETOX 505/5T DETOX 50*  [place]: [facility]  Attending:  Todd Pina DO MRN [id number]  : 1985  Age: 39 y.o.    Sex: female Date 2024         Substance Use History:     Social History     Substance and Sexual Activity   Alcohol Use Yes    Comment: 1/2 bottle vodka/day        Social History     Substance and Sexual Activity   Drug Use Yes    Types: Marijuana, Benzodiazepines       Time spent with Patient 20    CRS engaged with patient to check in and offer support is desired.  CRS made introduction and  explained CRS services provided at hospital    Patient reports that she has had been in treatment before and has been sober for 2 years previously. Patient has attended IOP, OP and recovery meetings. Patient stressors include, isolation, working from home and her MH issues.     Patient has a friend support group and attend her psychiatry appointments. Patient Lives alone with her 3 cats.     Patients goals are to go back to OP treatment and attend additional support meetings. Patient will be following up with her MH provider and will reach out if [facility] support is needed.      Resources and contact information given                Pam Disla

## 2024-12-11 NOTE — ASSESSMENT & PLAN NOTE
Drinks 1/2 bottle of vodka daily  Denies H/o withdrawal seizures  H/o non-commanding auditory hallucinations. recent episode 2 days ago   After education given, the pt is contemplating treatment with Naltrexone, after initially refusing it yesterday.  If decides to go with naltrexone PO, give naltrexone 25 mg PO x 1 and observe for side effects. If tolerates well, titrate up to 50 mg PO daily.  If pt decides to go with naltrexone  HERNANDEZ: administer an initial 25 mg oral dose of naltrexone to assess tolerance to the medication before proceeding with the long acting naltrexone injection .

## 2024-12-11 NOTE — DISCHARGE SUMMARY
Discharge Summary - Hospitalist   Name: Mckenna Brown 39 y.o. female I MRN: [id number]  Unit/Bed#: 5T DETOX 505-01 I Date of Admission: 12/9/2024   Date of Service: 12/11/2024 I Hospital Day: 2    Assessment & Plan  Alcohol withdrawal syndrome with complication (HCC)  History of anxiety and alcohol use presented for alcohol withdrawal symptoms.    She had a relapse and was consuming vodka  Patient was placed on SEWS protocol and did well.  No further withdrawal symptoms  Was offered naltrexone patient declined  Discharge: Outpatient services  Anxiety and depression  Mood stable continue prazosin quetiapine escitalopram and clonazepam as previously taken  Hypokalemia  Low potassium has been replaced.    Results from last 7 days   Lab Units 12/11/24  0505 12/10/24  0429 12/09/24  1821   POTASSIUM mmol/L 3.5 3.8 3.2*     Hypophosphatemia  Low phosphorus has been replaced  Pharyngitis  Secondary to vomiting/retching.  Will be discharged with famotidine      Medical Problems       Resolved Problems  Date Reviewed: 12/11/2024   None        Discharging Physician / Practitioner: Todd Pina DO  PCP: Narinder Armijo DO  Admission Date:   Admission Orders (From admission, onward)       Ordered        12/09/24 2057  INPATIENT ADMISSION  Once                        Discharge Date: 12/11/24    Consultations During Hospital Stay:  IP CONSULT TO CASE MANAGEMENT  CONSULT TO CERTIFIED   IP CONSULT TO TOXICOLOGY     Procedures Performed:   * No surgery found *     Images:   No results found.    Lab Results: I have reviewed the following results:  Results from last 7 days   Lab Units 12/10/24  0429 12/09/24  1821   WBC Thousand/uL 6.16 6.13   HEMOGLOBIN g/dL 10.8* 12.9   HEMATOCRIT % 32.3* 38.1   MCV fL 95 94   PLATELETS Thousands/uL 138* 177     Results from last 7 days   Lab Units 12/11/24  0505 12/10/24  0429 12/09/24  1821   SODIUM mmol/L 137 136 140   POTASSIUM mmol/L 3.5 3.8 3.2*   CHLORIDE mmol/L 103 104  "104   CO2 mmol/L 26 23 26   BUN mg/dL <2* 3* 3*   CREATININE mg/dL 0.55* 0.58* 0.58*   CALCIUM mg/dL 7.6* 7.4* 8.4   ALBUMIN g/dL  --  4.1 4.7   TOTAL BILIRUBIN mg/dL  --  0.71 0.42   ALK PHOS U/L  --  42 52   ALT U/L  --  12 12   AST U/L  --  61* 28   EGFR ml/min/1.73sq m 117 115 115   GLUCOSE RANDOM mg/dL 86 95 117       Test Results Pending at Discharge (will require follow up):      Reason for Admission:   Detox Evaluation (Pt looking to detox from drinking alcohol. States she drinks 1/2 bottle of vodka daily. \"In detox for the same recently but relapsed three weeks ago.\" Denies withdrawal seizures. )    Hospital Course:   Mckenna Brown is a 39 y.o. female patient who originally presented to the hospital on 12/9/2024 due to detox evaluation.  The patient relapsed and was consuming vodka and white claws.  She was seen by toxicology and placed on SEWs protocol.  She did have throat pain from retching and was prescribed famotidine.  She is being discharged home with outpatient services.    Please see above list of diagnoses and related plan for additional information.     Condition at Discharge: stable     Discharge Day Visit / Exam:   Subjective: Patient seen and examined.  Ready for discharge home.    Vitals: Blood Pressure: 123/72 (12/11/24 1114)  Pulse: 72 (12/11/24 1114)  Temperature: 97.6 °F (36.4 °C) (12/11/24 1114)  Temp Source: Temporal (12/11/24 1114)  Respirations: 16 (12/11/24 1114)  Height: 5' 6\" (167.6 cm) (12/09/24 2153)  Weight - Scale: 61.9 kg (136 lb 7.4 oz) (12/09/24 2153)  SpO2: 94 % (12/11/24 1114)    Exam:   Physical Exam  Vitals reviewed.   Constitutional:       General: She is not in acute distress.     Appearance: Normal appearance.   HENT:      Head: Atraumatic.   Cardiovascular:      Rate and Rhythm: Regular rhythm.   Pulmonary:      Breath sounds: No wheezing.   Abdominal:      General: Bowel sounds are normal.      Tenderness: There is no guarding or rebound.   Musculoskeletal:       "   General: No swelling.   Skin:     General: Skin is warm.   Neurological:      Mental Status: She is alert.   Psychiatric:         Mood and Affect: Mood normal.       Discharge instructions/Information to patient and family:   See after visit summary for information provided to patient and family.      Provisions for Follow-Up Care:  See after visit summary for information related to follow-up care and any pertinent home health orders.      Mobility at time of Discharge:  Basic Mobility Inpatient Raw Score: 24  JH-HLM Goal: 8: Walk 250 feet or more  JH-HLM Achieved: 8: Walk 250 feet ot more  JH-HLM Goal achieved. Continue to encourage appropriate mobility.    Disposition:   Home    Planned Readmission: No     Discharge Medications:  See after visit summary for reconciled discharge medications provided to patient and family.      Administrative Statements   I spent 35 minutes discharging the patient. This time was spent on the day of discharge. I had direct contact with the patient on the day of discharge. Greater than 50% of the total time was spent examining patient, answering all patient questions, arranging and discussing plan of care with patient as well as directly providing post-discharge instructions.  Additional time then spent on discharge activities.    **Please Note: This note may have been constructed using a voice recognition system**

## 2024-12-11 NOTE — ASSESSMENT & PLAN NOTE
Low potassium has been replaced.    Results from last 7 days   Lab Units 12/11/24  0505 12/10/24  0429 12/09/24  1821   POTASSIUM mmol/L 3.5 3.8 3.2*

## 2024-12-13 NOTE — UTILIZATION REVIEW
NOTIFICATION OF ADMISSION DISCHARGE   This is a Notification of Discharge from [facility]. Please be advised that this patient has been discharge from our facility. Below you will find the admission and discharge date and time including the patient’s disposition.   UTILIZATION REVIEW CONTACT:  Suzette Ross  Utilization   Network Utilization Review Department  Phone: [phone] x carefully listen to the prompts. All voicemails are confidential.  Email: [email or web address]     ADMISSION INFORMATION  PRESENTATION DATE: 12/9/2024  5:56 PM  OBERVATION ADMISSION DATE: N/A  INPATIENT ADMISSION DATE: 12/9/24  8:57 PM   DISCHARGE DATE: 12/11/2024  4:56 PM   DISPOSITION:Home/Self Care    Network Utilization Review Department  ATTENTION: Please call with any questions or concerns to [phone] and carefully listen to the prompts so that you are directed to the right person. All voicemails are confidential.   For Discharge needs, contact Care Management DC Support Team at [phone] opt. 2  Send all requests for admission clinical reviews, approved or denied determinations and any other requests to dedicated fax number below belonging to the campus where the patient is receiving treatment. List of dedicated fax numbers for the Facilities:  FACILITY NAME UR FAX NUMBER   ADMISSION DENIALS (Administrative/Medical Necessity) [phone]   DISCHARGE SUPPORT TEAM ([facility]) [phone]   PARENT CHILD HEALTH (Maternity/NICU/Pediatrics) [phone]   [facility] [phone]   [facility] [phone]   [facility] [phone]   [facility] [phone]   [facility] [phone]   [facility] [phone]   [facility] [phone]   [facility] [phone]    [facility] [phone]   [facility] [phone]   [facility] [phone]   [facility] [phone]

## 2025-05-11 ENCOUNTER — HOSPITAL ENCOUNTER (EMERGENCY)
Facility: HOSPITAL | Age: 40
Discharge: HOME | End: 2025-05-11
Attending: EMERGENCY MEDICINE
Payer: COMMERCIAL

## 2025-05-11 VITALS
HEART RATE: 113 BPM | OXYGEN SATURATION: 96 % | DIASTOLIC BLOOD PRESSURE: 79 MMHG | RESPIRATION RATE: 18 BRPM | SYSTOLIC BLOOD PRESSURE: 129 MMHG
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
## 2025-05-11 DIAGNOSIS — S61.512A SELF-INFLICTED LACERATION OF LEFT WRIST (HCC): Primary | ICD-10-CM

## 2025-05-11 PROCEDURE — 99284 EMERGENCY DEPT VISIT MOD MDM: CPT

## 2025-05-11 PROCEDURE — 12001 RPR S/N/AX/GEN/TRNK 2.5CM/<: CPT

## 2025-05-11 RX ORDER — BACITRACIN, NEOMYCIN, POLYMYXIN B 400; 3.5; 5 [USP'U]/G; MG/G; [USP'U]/G
1 OINTMENT TOPICAL ONCE
Status: COMPLETED | OUTPATIENT
Start: 2025-05-11 | End: 2025-05-11

## 2025-05-11 RX ORDER — LIDOCAINE HYDROCHLORIDE AND EPINEPHRINE 10; 10 MG/ML; UG/ML
10 INJECTION, SOLUTION INFILTRATION; PERINEURAL ONCE
Status: COMPLETED | OUTPATIENT
Start: 2025-05-11 | End: 2025-05-11

## 2025-05-11 RX ADMIN — BACITRACIN ZINC, NEOMYCIN SULFATE , POLYMYXIN B SULFATE. 1 SMALL APPLICATION: 400; 3.5; 5 OINTMENT TOPICAL at 01:56

## 2025-05-11 RX ADMIN — LIDOCAINE HYDROCHLORIDE,EPINEPHRINE BITARTRATE 10 ML: 10; .01 INJECTION, SOLUTION INFILTRATION; PERINEURAL at 01:43

## 2025-05-11 NOTE — ED PROVIDER NOTES
Time reflects when diagnosis was documented in both MDM as applicable and the Disposition within this note       Time User Action Codes Description Comment    5/11/2025  2:03 AM Macrina North Add [S61.512A,  X78.9XXA] Self-inflicted laceration of left wrist (HCC)           ED Disposition       ED Disposition   Discharge    Condition   Stable    Date/Time   Sun May 11, 2025  2:03 AM    Comment   Mckenna GARRISON Kevin discharge to home/self care.                   Assessment & Plan       Medical Decision Making  Patient seen, examined and noted to have laceration. Patient coming in with a self-inflicted laceration. She denies any alcohol or drug use.  She denies any suicidal ideation or homicidal ideation. She is very adamant that the intent of her cutting today was not to end her life. She uses this as a coping mechanism due to increased stress in her life including car troubles and her cat being ill. She has 2 friends who join her at bedside and are her support system and say they are comfortable taking her home and will be with her all night long. Patient is not interested in speaking with crisis or seeking inpatient hospitalization.  Patient has a psychiatrist who she spoke with on Thursday.  She is compliant with her Lexapro and Seroquel.  She also has a therapist who she used to see on a regular basis but has not spoken with recently.  She notes that she will call them on Monday and believes that they will be able to schedule her for an appointment on Tuesday or Wednesday.  On my evaluation patient is clinically sober and has decent insight and has capacity to make her own decisions. Discussed patient with my attending who also evaluated the patient and agrees that she has capacity to make her own decisions and as she has a plan for safe discharge she is in agreement with laceration repair and discharge to home.  Laceration was cleaned and repaired with 3 sutures.  Patient tolerated procedure well.  Had a tetanus  in 2019.  Strict return precautions were discussed which patient expressed her understanding of.    Differential diagnosis includes but is not limited to laceration, superficial laceration, accidental trauma     Patient appears well, is nontoxic appearing, she expresses understanding and agrees with plan of care at this time.  In light of this patient would benefit from outpatient management.    Patient at time of discharge well-appearing in no acute distress, all questions answered. Patient agreeable to plan.  Patient's vitals, lab/imaging results, diagnosis, and treatment plan were discussed with the patient. All new/changed medications were discussed with patient, specifically, route of administration, how often and when to take, and where they can be picked up. Strict return precautions as well as close follow up with PCP was discussed with the patient and the patient was agreeable to my recommendations. Patient verbally acknowledged understanding of the above communications. Strict return precautions were provided. All labs reviewed and utilized in the medical decision making process.    Risk  OTC drugs.  Prescription drug management.        ED Course as of 05/11/25 0308   Sun May 11, 2025   0114 Patient coming in with a self-inflicted laceration to her left wrist. States that she was feeling overwhelmed at home that she recently lost her car and her cat who is very important to her has been having health issues. Patient denies SI or HI. Denies using any drugs or alcohol prior to this incident. Has a history of alcohol use disorder and states that she was clean for 4 months until last week when she drank. Called her friends who are in the room with her tonight who brought her in to the ER for evaluation.        Medications   lidocaine-epinephrine (XYLOCAINE/EPINEPHRINE) 1 %-1:100,000 injection 10 mL (10 mL Infiltration Given by Other 5/11/25 0143)   neomycin-bacitracin-polymyxin b (NEOSPORIN) ointment 1 small  application (1 small application Topical Given by Other 25 0156)       ED Risk Strat Scores                    No data recorded                            History of Present Illness       Chief Complaint   Patient presents with    Laceration     Pt reports she cut her arm accidentally by falling on coffee table. After further discussion, pt admits that she intentionally harmed herself with a knife. Lacerations present on L forearm. Bleeding controlled. Pt recently quit drinking but relapsed. Pt has had psych admissions in past.        Past Medical History:   Diagnosis Date    Anxiety     Migraine       Past Surgical History:   Procedure Laterality Date     SECTION        No family history on file.   Social History     Tobacco Use    Smoking status: Never    Smokeless tobacco: Never   Vaping Use    Vaping status: Never Used   Substance Use Topics    Alcohol use: Yes     Comment: 1/2 bottle vodka/day    Drug use: Yes     Types: Marijuana, Benzodiazepines      E-Cigarette/Vaping    E-Cigarette Use Never User       E-Cigarette/Vaping Substances      I have reviewed and agree with the history as documented.     Mckenna Brown is a 40 y.o. female with a PMH of alcohol use disorder and PTSD presenting to the ED on May 11, 2025 with laceration. Patient endorses that she has had increased stress in her life secondary to her beloved cat being sick and losing her car.  She notes that she was intentionally cutting her arm with a knife this evening because she was feeling very overwhelmed.  She notes that she accidentally cut herself too deeply and called her friends for help. Had no intention of ending her life with her cutting tonight. Her 2 friends came over and brought her to the emergency department for evaluation.  Patient denies any suicidal ideation, thoughts of wanting to die or wanting to harm others.  She has a psychiatrist and takes lexapro and seroquil which she is compliant with.  Used to see a  therapist on a regular basis but has not recently.  She plans to call them on Monday to make an appointment.  Patient denies intent to end her own life, suicidal thoughts, homicidal ideations, alcohol use, drug use or any other complaints at this time.            Review of Systems   Musculoskeletal:  Negative for myalgias.   Skin:  Positive for wound.   Psychiatric/Behavioral:  Positive for self-injury. Negative for agitation, hallucinations and suicidal ideas. The patient is nervous/anxious.            Objective       ED Triage Vitals [05/11/25 0048]   Temp Pulse Blood Pressure Respirations SpO2 Patient Position - Orthostatic VS   -- (!) 113 129/79 18 96 % Sitting      Temp src Heart Rate Source BP Location FiO2 (%) Pain Score    -- Monitor Right arm -- --      Vitals      Date and Time Temp Pulse SpO2 Resp BP Pain Score FACES Pain Rating User   05/11/25 0048 -- 113 96 % 18 129/79 -- -- KD            Physical Exam  Constitutional:       General: She is not in acute distress.     Appearance: Normal appearance. She is normal weight. She is not ill-appearing or toxic-appearing.   Cardiovascular:      Pulses: Normal pulses.   Skin:     Comments: 1.5cm horizontal laceration to left wrist, variety of small very superficial lacerations along wrist as well that do not require repair   Neurological:      General: No focal deficit present.      Mental Status: She is alert and oriented to person, place, and time.   Psychiatric:         Attention and Perception: Attention and perception normal.         Speech: Speech normal.         Behavior: Behavior normal. Behavior is not agitated or withdrawn. Behavior is cooperative.         Thought Content: Thought content normal. Thought content is not paranoid or delusional. Thought content does not include homicidal or suicidal ideation. Thought content does not include homicidal or suicidal plan.         Results Reviewed       None            No orders to display         Universal  "Protocol:  procedure performed by consultantConsent: Verbal consent obtained.  Risks and benefits: risks, benefits and alternatives were discussed  Consent given by: patient  Time out: Immediately prior to procedure a \"time out\" was called to verify the correct patient, procedure, equipment, support staff and site/side marked as required.  Patient understanding: patient states understanding of the procedure being performed  Required items: required blood products, implants, devices, and special equipment available  Patient identity confirmed: verbally with patient and arm band  Laceration repair    Date/Time: 5/11/2025 1:50 AM    Performed by: Macrina North PA-C  Authorized by: Macrina North PA-C  Body area: upper extremity  Location details: left wrist  Laceration length: 1.5 cm  Foreign bodies: no foreign bodies  Tendon involvement: none  Nerve involvement: none  Anesthesia: local infiltration    Anesthesia:  Local Anesthetic: lidocaine 1% with epinephrine  Anesthetic total: 2 mL    Wound Dehiscence:  Superficial Wound Dehiscence: simple closure      Procedure Details:  Preparation: Patient was prepped and draped in the usual sterile fashion.  Irrigation solution: saline  Irrigation method: syringe  Amount of cleaning: standard  Debridement: none  Degree of undermining: none  Skin closure: 5-0 nylon  Number of sutures: 3  Technique: simple  Approximation: close  Approximation difficulty: simple  Dressing: antibiotic ointment and gauze roll  Patient tolerance: patient tolerated the procedure well with no immediate complications          ED Medication and Procedure Management   Prior to Admission Medications   Prescriptions Last Dose Informant Patient Reported? Taking?   QUEtiapine (SEROquel) 50 mg tablet   Yes No   Sig: Take 50 mg by mouth daily at bedtime   clonazePAM (KlonoPIN) 1 mg tablet   No No   Sig: Take 1 tablet (1 mg total) by mouth 2 (two) times a day for 14 days   escitalopram (LEXAPRO) 20 mg " tablet   No No   Sig: Take 1 tablet (20 mg total) by mouth daily for 14 days   famotidine (PEPCID) 20 mg tablet   No No   Sig: Take 1 tablet (20 mg total) by mouth 2 (two) times a day   folic acid (FOLVITE) 1 mg tablet   No No   Sig: Take 1 tablet (1 mg total) by mouth daily   gabapentin (NEURONTIN) 600 MG tablet   Yes No   Sig: Take 600 mg by mouth daily at bedtime   prazosin (MINIPRESS) 2 mg capsule   Yes No   Sig: Take 2 mg by mouth daily at bedtime   thiamine 100 MG tablet   No No   Sig: Take 1 tablet (100 mg total) by mouth daily      Facility-Administered Medications: None     Discharge Medication List as of 5/11/2025  2:06 AM        CONTINUE these medications which have NOT CHANGED    Details   clonazePAM (KlonoPIN) 1 mg tablet Take 1 tablet (1 mg total) by mouth 2 (two) times a day for 14 days, Starting Wed 8/21/2024, Until Wed 9/4/2024, Normal      escitalopram (LEXAPRO) 20 mg tablet Take 1 tablet (20 mg total) by mouth daily for 14 days, Starting Wed 8/21/2024, Normal      famotidine (PEPCID) 20 mg tablet Take 1 tablet (20 mg total) by mouth 2 (two) times a day, Starting Wed 12/11/2024, Normal      folic acid (FOLVITE) 1 mg tablet Take 1 tablet (1 mg total) by mouth daily, Starting Thu 12/12/2024, Normal      gabapentin (NEURONTIN) 600 MG tablet Take 600 mg by mouth daily at bedtime, Starting Wed 9/18/2024, Historical Med      prazosin (MINIPRESS) 2 mg capsule Take 2 mg by mouth daily at bedtime, Starting Sun 12/8/2024, Historical Med      QUEtiapine (SEROquel) 50 mg tablet Take 50 mg by mouth daily at bedtime, Starting Tue 11/12/2024, Historical Med      thiamine 100 MG tablet Take 1 tablet (100 mg total) by mouth daily, Starting Thu 12/12/2024, Normal           No discharge procedures on file.  ED SEPSIS DOCUMENTATION   Time reflects when diagnosis was documented in both MDM as applicable and the Disposition within this note       Time User Action Codes Description Comment    5/11/2025  2:03 AM Can  Macrina Nolasco [S61.512A,  X78.9XXA] Self-inflicted laceration of left wrist (HCC)                  Macrina North PA-C  05/11/25 0305

## 2025-05-11 NOTE — DISCHARGE INSTRUCTIONS
Please change the dressing of your wound every 24 hours for the first 2 days.  Between dressing changes wash the wound with warm soapy water and apply Neosporin.  After 48 to 72 hours can begin to just keep it covered with a Band-Aid while you are at work. Please schedule an appointment with your primary care to have your stitches removed in 7 to 14 days. Please call your therapist for follow-up. Return to the emergency department if you have any thoughts of harming yourself, thoughts of ending your own life or thoughts of hurting other people, if your wound shows signs of increasing redness, drainage or you develop fevers.

## 2025-07-16 ENCOUNTER — HOSPITAL ENCOUNTER (EMERGENCY)
Facility: HOSPITAL | Age: 40
Discharge: HOME | End: 2025-07-16
Attending: EMERGENCY MEDICINE
Payer: COMMERCIAL

## 2025-07-16 VITALS
TEMPERATURE: 98.4 F | HEART RATE: 98 BPM | DIASTOLIC BLOOD PRESSURE: 77 MMHG | OXYGEN SATURATION: 99 % | SYSTOLIC BLOOD PRESSURE: 142 MMHG | RESPIRATION RATE: 12 BRPM

## 2025-07-16 DIAGNOSIS — F10.90 ALCOHOL USE DISORDER: Primary | ICD-10-CM

## 2025-07-16 DIAGNOSIS — F10.930 ALCOHOL WITHDRAWAL SYNDROME WITHOUT COMPLICATION (HCC): ICD-10-CM

## 2025-07-16 LAB
ALBUMIN SERPL BCG-MCNC: 4.5 G/DL (ref 3.5–5)
ALP SERPL-CCNC: 55 U/L (ref 34–104)
ALT SERPL W P-5'-P-CCNC: 10 U/L (ref 7–52)
ANION GAP SERPL CALCULATED.3IONS-SCNC: 11 MMOL/L (ref 4–13)
AST SERPL W P-5'-P-CCNC: 17 U/L (ref 13–39)
BASOPHILS # BLD AUTO: 0.07 THOUSANDS/ÂΜL (ref 0–0.1)
BASOPHILS NFR BLD AUTO: 2 % (ref 0–1)
BILIRUB SERPL-MCNC: 0.37 MG/DL (ref 0.2–1)
BUN SERPL-MCNC: 8 MG/DL (ref 5–25)
CALCIUM SERPL-MCNC: 8.5 MG/DL (ref 8.4–10.2)
CHLORIDE SERPL-SCNC: 97 MMOL/L (ref 96–108)
CO2 SERPL-SCNC: 23 MMOL/L (ref 21–32)
CREAT SERPL-MCNC: 0.68 MG/DL (ref 0.6–1.3)
EOSINOPHIL # BLD AUTO: 0.01 THOUSAND/ÂΜL (ref 0–0.61)
EOSINOPHIL NFR BLD AUTO: 0 % (ref 0–6)
ERYTHROCYTE [DISTWIDTH] IN BLOOD BY AUTOMATED COUNT: 16.2 % (ref 11.6–15.1)
GFR SERPL CREATININE-BSD FRML MDRD: 108 ML/MIN/1.73SQ M
GLUCOSE SERPL-MCNC: 115 MG/DL (ref 65–140)
HCG SERPL QL: NEGATIVE
HCT VFR BLD AUTO: 35.4 % (ref 34.8–46.1)
HGB BLD-MCNC: 11.6 G/DL (ref 11.5–15.4)
IMM GRANULOCYTES # BLD AUTO: 0.01 THOUSAND/UL (ref 0–0.2)
IMM GRANULOCYTES NFR BLD AUTO: 0 % (ref 0–2)
LIPASE SERPL-CCNC: 26 U/L (ref 11–82)
LYMPHOCYTES # BLD AUTO: 1.09 THOUSANDS/ÂΜL (ref 0.6–4.47)
LYMPHOCYTES NFR BLD AUTO: 25 % (ref 14–44)
MCH RBC QN AUTO: 28.6 PG (ref 26.8–34.3)
MCHC RBC AUTO-ENTMCNC: 32.8 G/DL (ref 31.4–37.4)
MCV RBC AUTO: 87 FL (ref 82–98)
MONOCYTES # BLD AUTO: 0.31 THOUSAND/ÂΜL (ref 0.17–1.22)
MONOCYTES NFR BLD AUTO: 7 % (ref 4–12)
NEUTROPHILS # BLD AUTO: 2.96 THOUSANDS/ÂΜL (ref 1.85–7.62)
NEUTS SEG NFR BLD AUTO: 66 % (ref 43–75)
NRBC BLD AUTO-RTO: 0 /100 WBCS
PLATELET # BLD AUTO: 357 THOUSANDS/UL (ref 149–390)
PMV BLD AUTO: 8.2 FL (ref 8.9–12.7)
POTASSIUM SERPL-SCNC: 4.2 MMOL/L (ref 3.5–5.3)
PROT SERPL-MCNC: 7.9 G/DL (ref 6.4–8.4)
RBC # BLD AUTO: 4.06 MILLION/UL (ref 3.81–5.12)
SODIUM SERPL-SCNC: 131 MMOL/L (ref 135–147)
WBC # BLD AUTO: 4.45 THOUSAND/UL (ref 4.31–10.16)

## 2025-07-16 PROCEDURE — 96361 HYDRATE IV INFUSION ADD-ON: CPT

## 2025-07-16 PROCEDURE — 96374 THER/PROPH/DIAG INJ IV PUSH: CPT

## 2025-07-16 PROCEDURE — 36415 COLL VENOUS BLD VENIPUNCTURE: CPT

## 2025-07-16 PROCEDURE — 80053 COMPREHEN METABOLIC PANEL: CPT

## 2025-07-16 PROCEDURE — 85025 COMPLETE CBC W/AUTO DIFF WBC: CPT

## 2025-07-16 PROCEDURE — 96375 TX/PRO/DX INJ NEW DRUG ADDON: CPT

## 2025-07-16 PROCEDURE — 93005 ELECTROCARDIOGRAM TRACING: CPT

## 2025-07-16 PROCEDURE — 84703 CHORIONIC GONADOTROPIN ASSAY: CPT | Performed by: EMERGENCY MEDICINE

## 2025-07-16 PROCEDURE — 83690 ASSAY OF LIPASE: CPT | Performed by: EMERGENCY MEDICINE

## 2025-07-16 PROCEDURE — 99285 EMERGENCY DEPT VISIT HI MDM: CPT | Performed by: EMERGENCY MEDICINE

## 2025-07-16 PROCEDURE — 99285 EMERGENCY DEPT VISIT HI MDM: CPT

## 2025-07-16 RX ORDER — ONDANSETRON 2 MG/ML
4 INJECTION INTRAMUSCULAR; INTRAVENOUS ONCE
Status: COMPLETED | OUTPATIENT
Start: 2025-07-16 | End: 2025-07-16

## 2025-07-16 RX ORDER — IBUPROFEN 600 MG/1
600 TABLET, FILM COATED ORAL ONCE
Status: COMPLETED | OUTPATIENT
Start: 2025-07-16 | End: 2025-07-16

## 2025-07-16 RX ORDER — WATER 10 ML/10ML
INJECTION INTRAMUSCULAR; INTRAVENOUS; SUBCUTANEOUS
Status: COMPLETED
Start: 2025-07-16 | End: 2025-07-16

## 2025-07-16 RX ORDER — DIPHENHYDRAMINE HYDROCHLORIDE AND LIDOCAINE HYDROCHLORIDE AND ALUMINUM HYDROXIDE AND MAGNESIUM HYDRO
10 KIT ONCE
Status: COMPLETED | OUTPATIENT
Start: 2025-07-16 | End: 2025-07-16

## 2025-07-16 RX ORDER — DIAZEPAM 10 MG/2ML
5 INJECTION, SOLUTION INTRAMUSCULAR; INTRAVENOUS ONCE
Status: COMPLETED | OUTPATIENT
Start: 2025-07-16 | End: 2025-07-16

## 2025-07-16 RX ORDER — PANTOPRAZOLE SODIUM 40 MG/10ML
40 INJECTION, POWDER, LYOPHILIZED, FOR SOLUTION INTRAVENOUS ONCE
Status: COMPLETED | OUTPATIENT
Start: 2025-07-16 | End: 2025-07-16

## 2025-07-16 RX ADMIN — ONDANSETRON 4 MG: 2 INJECTION INTRAMUSCULAR; INTRAVENOUS at 14:14

## 2025-07-16 RX ADMIN — DIPHENHYDRAMINE HYDROCHLORIDE AND LIDOCAINE HYDROCHLORIDE AND ALUMINUM HYDROXIDE AND MAGNESIUM HYDRO 10 ML: KIT at 15:28

## 2025-07-16 RX ADMIN — IBUPROFEN 600 MG: 600 TABLET ORAL at 14:14

## 2025-07-16 RX ADMIN — WATER: 1 INJECTION INTRAMUSCULAR; INTRAVENOUS; SUBCUTANEOUS at 14:56

## 2025-07-16 RX ADMIN — DIAZEPAM 5 MG: 10 INJECTION, SOLUTION INTRAMUSCULAR; INTRAVENOUS at 14:13

## 2025-07-16 RX ADMIN — SODIUM CHLORIDE 1000 ML: 0.9 INJECTION, SOLUTION INTRAVENOUS at 14:16

## 2025-07-16 RX ADMIN — PANTOPRAZOLE SODIUM 40 MG: 40 INJECTION, POWDER, LYOPHILIZED, FOR SOLUTION INTRAVENOUS at 14:39

## 2025-07-16 NOTE — ED PROVIDER NOTES
ED Disposition       None          Assessment & Plan   {Hyperlinks  Risk Stratification - NIHSS - HEART SCORE - Fill out sepsis note and make sure you call [id number] if severe or septic shock:[id number]}    Medical Decision Making           Medications - No data to display    ED Risk Strat Scores                    No data recorded        SBIRT 22yo+      Flowsheet Row Most Recent Value   Initial Alcohol Screen: US AUDIT-C     1. How often do you have a drink containing alcohol? 6 Filed at: 07/16/2025 1221   2. How many drinks containing alcohol do you have on a typical day you are drinking?  5 Filed at: 07/16/2025 1221   3b. FEMALE Any Age, or MALE 65+: How often do you have 4 or more drinks on one occassion? 6 Filed at: 07/16/2025 1221   Audit-C Score 17 Filed at: 07/16/2025 1221   Full Alcohol Screen: US AUDIT    4. How often during the last year have you found that you were not able to stop drinking once you had started? 4 Filed at: 07/16/2025 1221   5. How often during past year have you failed to do what was normally expected of you because of drinking?  3 Filed at: 07/16/2025 1221   6. How often in past year have you needed a first drink in the morning to get yourself going after a heavy drinking session?  3 Filed at: 07/16/2025 1221   7. How often in past year have you had feeling of guilt or remorse after drinking?  4 Filed at: 07/16/2025 1221   8. How often in past year have you been unable to remember what happened night before because you had been drinking?  4 Filed at: 07/16/2025 1221   9. Have you or someone else been injured as a result of your drinking?  4 Filed at: 07/16/2025 1221   10. Has a relative, friend, doctor or other health worker been concerned about your drinking and suggested you cut down?  4 Filed at: 07/16/2025 1221   AUDIT Total Score 43 Filed at: 07/16/2025 1221   UZAIR: How many times in the past year have you...    Used an illegal drug or used a prescription medication for non-medical  reasons? Never Filed at: 07/16/2025 1221                            History of Present Illness   {Hyperlinks  History (Med, Surg, Fam, Social) - Current Medications - Allergies  :[id number]}    Chief Complaint   Patient presents with    Withdrawal - Alcohol     Last drink around 4 am  (6 buzzballs 15% alcohol and 2 twisted teas) has been drinking this or more for the last week but intermittently for the last few months. Vomiting this morning, light sensitivity,   No hx of seizures   Has been through detox and rehab        Past Medical History[1]   Past Surgical History[2]   Family History[3]   Social History[4]   E-Cigarette/Vaping    E-Cigarette Use Never User       E-Cigarette/Vaping Substances      I have reviewed and agree with the history as documented.     Last drink around 4 am, had twisted tea, drinking the past several days,         Review of Systems        Objective   {Hyperlinks  Historical Vitals - Historical Labs - Chart Review/Microbiology - Last Echo - Code Status  :[id number]}    ED Triage Vitals [07/16/25 1219]   Temperature Pulse Blood Pressure Respirations SpO2 Patient Position - Orthostatic VS   98.4 °F (36.9 °C) (!) 132 (!) 176/106 22 99 % Sitting      Temp Source Heart Rate Source BP Location FiO2 (%) Pain Score    Oral Monitor Right arm -- --      Vitals      Date and Time Temp Pulse SpO2 Resp BP Pain Score FACES Pain Rating User   07/16/25 1219 98.4 °F (36.9 °C) 132 99 % 22 176/106 -- -- [place]            Physical Exam    Results Reviewed       Procedure Component Value Units Date/Time    Comprehensive metabolic panel [[id number]]  (Abnormal) Collected: 07/16/25 1228    Lab Status: Final result Specimen: Blood from Arm, Right Updated: 07/16/25 1311     Sodium 131 mmol/L      Potassium 4.2 mmol/L      Chloride 97 mmol/L      CO2 23 mmol/L      ANION GAP 11 mmol/L      BUN 8 mg/dL      Creatinine 0.68 mg/dL      Glucose 115 mg/dL      Calcium 8.5 mg/dL      AST 17 U/L      ALT 10 U/L      Alkaline  Phosphatase 55 U/L      Total Protein 7.9 g/dL      Albumin 4.5 g/dL      Total Bilirubin 0.37 mg/dL      eGFR 108 ml/min/1.73sq m     Narrative:      National Kidney Disease Foundation guidelines for Chronic Kidney Disease (CKD):     Stage 1 with normal or high GFR (GFR > 90 mL/min/1.73 square meters)    Stage 2 Mild CKD (GFR = 60-89 mL/min/1.73 square meters)    Stage 3A Moderate CKD (GFR = 45-59 mL/min/1.73 square meters)    Stage 3B Moderate CKD (GFR = 30-44 mL/min/1.73 square meters)    Stage 4 Severe CKD (GFR = 15-29 mL/min/1.73 square meters)    Stage 5 End Stage CKD (GFR <15 mL/min/1.73 square meters)  Note: GFR calculation is accurate only with a steady state creatinine    CBC and differential [[id number]]  (Abnormal) Collected: 07/16/25 1228    Lab Status: Final result Specimen: Blood from Arm, Right Updated: 07/16/25 1258     WBC 4.45 Thousand/uL      RBC 4.06 Million/uL      Hemoglobin 11.6 g/dL      Hematocrit 35.4 %      MCV 87 fL      MCH 28.6 pg      MCHC 32.8 g/dL      RDW 16.2 %      MPV 8.2 fL      Platelets 357 Thousands/uL      nRBC 0 /100 WBCs      Segmented % 66 %      Immature Grans % 0 %      Lymphocytes % 25 %      Monocytes % 7 %      Eosinophils Relative 0 %      Basophils Relative 2 %      Absolute Neutrophils 2.96 Thousands/µL      Absolute Immature Grans 0.01 Thousand/uL      Absolute Lymphocytes 1.09 Thousands/µL      Absolute Monocytes 0.31 Thousand/µL      Eosinophils Absolute 0.01 Thousand/µL      Basophils Absolute 0.07 Thousands/µL             No orders to display       Procedures    ED Medication and Procedure Management   Prior to Admission Medications   Prescriptions Last Dose Informant Patient Reported? Taking?   QUEtiapine (SEROquel) 50 mg tablet   Yes No   Sig: Take 50 mg by mouth daily at bedtime   clonazePAM (KlonoPIN) 1 mg tablet   No No   Sig: Take 1 tablet (1 mg total) by mouth 2 (two) times a day for 14 days   escitalopram (LEXAPRO) 20 mg tablet   No No   Sig: Take 1  tablet (20 mg total) by mouth daily for 14 days   famotidine (PEPCID) 20 mg tablet   No No   Sig: Take 1 tablet (20 mg total) by mouth 2 (two) times a day   folic acid (FOLVITE) 1 mg tablet   No No   Sig: Take 1 tablet (1 mg total) by mouth daily   gabapentin (NEURONTIN) 600 MG tablet   Yes No   Sig: Take 600 mg by mouth daily at bedtime   prazosin (MINIPRESS) 2 mg capsule   Yes No   Sig: Take 2 mg by mouth daily at bedtime   thiamine 100 MG tablet   No No   Sig: Take 1 tablet (100 mg total) by mouth daily      Facility-Administered Medications: None     Patient's Medications   Discharge Prescriptions    No medications on file     No discharge procedures on file.  ED SEPSIS DOCUMENTATION              [1]   Past Medical History:  Diagnosis Date    Anxiety     Migraine    [2]   Past Surgical History:  Procedure Laterality Date     SECTION     [3] No family history on file.  [4]   Social History  Tobacco Use    Smoking status: Never    Smokeless tobacco: Never   Vaping Use    Vaping status: Never Used   Substance Use Topics    Alcohol use: Yes     Comment: 1/2 bottle vodka/day    Drug use: Yes     Types: Marijuana, Benzodiazepines      wheezes or rales appreciated on exam.     Abdominal:      General: Abdomen is flat. Bowel sounds are normal. There is no distension.      Palpations: Abdomen is soft.      Tenderness: There is no abdominal tenderness.      Comments: Soft, non tender, normo-active bowel sounds. Without rigidity, guarding, or distension.       Musculoskeletal:         General: Normal range of motion.      Cervical back: Normal range of motion.     Skin:     General: Skin is warm and dry.     Neurological:      General: No focal deficit present.      Mental Status: She is alert and oriented to person, place, and time. Mental status is at baseline.      Cranial Nerves: No cranial nerve deficit.      Sensory: No sensory deficit.      Motor: No weakness.      Coordination: Coordination normal.      Gait: Gait normal.      Comments: Minimal tremulousness at bedside.  CN grossly intact on visualization. No focal neurologic deficits noted on exam.  5/5 strength in all extremities. Neurovascularly intact with normal sensation and motor function.           Results Reviewed       Procedure Component Value Units Date/Time    hCG, qualitative pregnancy [[id number]]  (Normal) Collected: 07/16/25 1228    Lab Status: Final result Specimen: Blood from Arm, Right Updated: 07/16/25 1517     Preg, Serum Negative    Lipase [[id number]]  (Normal) Collected: 07/16/25 1228    Lab Status: Final result Specimen: Blood from Arm, Right Updated: 07/16/25 1517     Lipase 26 u/L     Comprehensive metabolic panel [[id number]]  (Abnormal) Collected: 07/16/25 1228    Lab Status: Final result Specimen: Blood from Arm, Right Updated: 07/16/25 1311     Sodium 131 mmol/L      Potassium 4.2 mmol/L      Chloride 97 mmol/L      CO2 23 mmol/L      ANION GAP 11 mmol/L      BUN 8 mg/dL      Creatinine 0.68 mg/dL      Glucose 115 mg/dL      Calcium 8.5 mg/dL      AST 17 U/L      ALT 10 U/L      Alkaline Phosphatase 55 U/L      Total Protein 7.9 g/dL      Albumin 4.5 g/dL       Total Bilirubin 0.37 mg/dL      eGFR 108 ml/min/1.73sq m     Narrative:      National Kidney Disease Foundation guidelines for Chronic Kidney Disease (CKD):     Stage 1 with normal or high GFR (GFR > 90 mL/min/1.73 square meters)    Stage 2 Mild CKD (GFR = 60-89 mL/min/1.73 square meters)    Stage 3A Moderate CKD (GFR = 45-59 mL/min/1.73 square meters)    Stage 3B Moderate CKD (GFR = 30-44 mL/min/1.73 square meters)    Stage 4 Severe CKD (GFR = 15-29 mL/min/1.73 square meters)    Stage 5 End Stage CKD (GFR <15 mL/min/1.73 square meters)  Note: GFR calculation is accurate only with a steady state creatinine    CBC and differential [[id number]]  (Abnormal) Collected: 07/16/25 1228    Lab Status: Final result Specimen: Blood from Arm, Right Updated: 07/16/25 1258     WBC 4.45 Thousand/uL      RBC 4.06 Million/uL      Hemoglobin 11.6 g/dL      Hematocrit 35.4 %      MCV 87 fL      MCH 28.6 pg      MCHC 32.8 g/dL      RDW 16.2 %      MPV 8.2 fL      Platelets 357 Thousands/uL      nRBC 0 /100 WBCs      Segmented % 66 %      Immature Grans % 0 %      Lymphocytes % 25 %      Monocytes % 7 %      Eosinophils Relative 0 %      Basophils Relative 2 %      Absolute Neutrophils 2.96 Thousands/µL      Absolute Immature Grans 0.01 Thousand/uL      Absolute Lymphocytes 1.09 Thousands/µL      Absolute Monocytes 0.31 Thousand/µL      Eosinophils Absolute 0.01 Thousand/µL      Basophils Absolute 0.07 Thousands/µL             No orders to display       ECG 12 Lead Documentation Only    Date/Time: 7/16/2025 12:23 PM    Performed by: Chin Orozco MD  Authorized by: Chin Orozco MD    ECG reviewed by me, the ED Provider: yes    Patient location:  ED  Previous ECG:     Previous ECG:  Compared to current    Similarity:  No change  Interpretation:     Interpretation: normal    Rate:     ECG rate:  128    ECG rate assessment: tachycardic    Rhythm:     Rhythm: sinus tachycardia    Ectopy:     Ectopy: none    QRS:     QRS axis:  Normal    QRS  intervals:  Normal  Conduction:     Conduction: normal    ST segments:     ST segments:  Normal  T waves:     T waves: normal        ED Medication and Procedure Management   Prior to Admission Medications   Prescriptions Last Dose Informant Patient Reported? Taking?   QUEtiapine (SEROquel) 50 mg tablet   Yes No   Sig: Take 50 mg by mouth daily at bedtime   clonazePAM (KlonoPIN) 1 mg tablet   No No   Sig: Take 1 tablet (1 mg total) by mouth 2 (two) times a day for 14 days   escitalopram (LEXAPRO) 20 mg tablet   No No   Sig: Take 1 tablet (20 mg total) by mouth daily for 14 days   famotidine (PEPCID) 20 mg tablet   No No   Sig: Take 1 tablet (20 mg total) by mouth 2 (two) times a day   folic acid (FOLVITE) 1 mg tablet   No No   Sig: Take 1 tablet (1 mg total) by mouth daily   gabapentin (NEURONTIN) 600 MG tablet   Yes No   Sig: Take 600 mg by mouth daily at bedtime   prazosin (MINIPRESS) 2 mg capsule   Yes No   Sig: Take 2 mg by mouth daily at bedtime   thiamine 100 MG tablet   No No   Sig: Take 1 tablet (100 mg total) by mouth daily      Facility-Administered Medications: None     Discharge Medication List as of 7/16/2025  3:40 PM        CONTINUE these medications which have NOT CHANGED    Details   clonazePAM (KlonoPIN) 1 mg tablet Take 1 tablet (1 mg total) by mouth 2 (two) times a day for 14 days, Starting Wed 8/21/2024, Until Wed 9/4/2024, Normal      escitalopram (LEXAPRO) 20 mg tablet Take 1 tablet (20 mg total) by mouth daily for 14 days, Starting Wed 8/21/2024, Normal      famotidine (PEPCID) 20 mg tablet Take 1 tablet (20 mg total) by mouth 2 (two) times a day, Starting Wed 12/11/2024, Normal      folic acid (FOLVITE) 1 mg tablet Take 1 tablet (1 mg total) by mouth daily, Starting Thu 12/12/2024, Normal      gabapentin (NEURONTIN) 600 MG tablet Take 600 mg by mouth daily at bedtime, Starting Wed 9/18/2024, Historical Med      prazosin (MINIPRESS) 2 mg capsule Take 2 mg by mouth daily at bedtime, Starting Sun  2024, Historical Med      QUEtiapine (SEROquel) 50 mg tablet Take 50 mg by mouth daily at bedtime, Starting Tue 2024, Historical Med      thiamine 100 MG tablet Take 1 tablet (100 mg total) by mouth daily, Starting Thu 2024, Normal           No discharge procedures on file.  ED SEPSIS DOCUMENTATION   Time reflects when diagnosis was documented in both MDM as applicable and the Disposition within this note       Time User Action Codes Description Comment    2025  3:06 PM Chin Orozco [F10.90] Alcohol use disorder     2025  3:06 PM Chin Orozco [F10.930] Alcohol withdrawal syndrome without complication (HCC)                    [1]   Past Medical History:  Diagnosis Date    Anxiety     Migraine    [2]   Past Surgical History:  Procedure Laterality Date     SECTION     [3] No family history on file.  [4]   Social History  Tobacco Use    Smoking status: Never    Smokeless tobacco: Never   Vaping Use    Vaping status: Never Used   Substance Use Topics    Alcohol use: Yes     Comment: 1/2 bottle vodka/day    Drug use: Yes     Types: Marijuana, Benzodiazepines        Chin Orozco MD  [phone]

## 2025-07-16 NOTE — ED ATTENDING ATTESTATION
7/16/2025  I, Zeeshan Navarrete MD, saw and evaluated the patient. I have discussed the patient with the resident/non-physician practitioner and agree with the resident's/non-physician practitioner's findings, Plan of Care, and MDM as documented in the resident's/non-physician practitioner's note, except where noted. All available labs and Radiology studies were reviewed.  I was present for key portions of any procedure(s) performed by the resident/non-physician practitioner and I was immediately available to provide assistance.       At this point I agree with the current assessment done in the Emergency Department.  I have conducted an independent evaluation of this patient a history and physical is as follows: Patient presents for evaluation of nausea and shakiness.  She has a history of chronic alcohol use, has been to detox several times and is currently interested in an outpatient treatment plan.  She has no suicidal or homicidal ideation.  Denies any focal abdominal pain.  Reports nausea related to alcohol use, potentially gastritis in nature.  Laboratory studies with no significant emergent abnormalities.  Patient given multimodal pain and nausea relief in ED.  She is showing no signs of life-threatening alcohol withdrawal.  She is GCS 15.  She was given referral to [facility] in ED.  She has outpatient resources.  She is stable for discharge home, does not desire inpatient detox at this time.    Results Reviewed       Procedure Component Value Units Date/Time    hCG, qualitative pregnancy [[id number]]  (Normal) Collected: 07/16/25 1228    Lab Status: Final result Specimen: Blood from Arm, Right Updated: 07/16/25 1517     Preg, Serum Negative    Lipase [[id number]]  (Normal) Collected: 07/16/25 1228    Lab Status: Final result Specimen: Blood from Arm, Right Updated: 07/16/25 1517     Lipase 26 u/L     Comprehensive metabolic panel [[id number]]  (Abnormal) Collected: 07/16/25 1228    Lab Status: Final  result Specimen: Blood from Arm, Right Updated: 07/16/25 1311     Sodium 131 mmol/L      Potassium 4.2 mmol/L      Chloride 97 mmol/L      CO2 23 mmol/L      ANION GAP 11 mmol/L      BUN 8 mg/dL      Creatinine 0.68 mg/dL      Glucose 115 mg/dL      Calcium 8.5 mg/dL      AST 17 U/L      ALT 10 U/L      Alkaline Phosphatase 55 U/L      Total Protein 7.9 g/dL      Albumin 4.5 g/dL      Total Bilirubin 0.37 mg/dL      eGFR 108 ml/min/1.73sq m     Narrative:      National Kidney Disease Foundation guidelines for Chronic Kidney Disease (CKD):     Stage 1 with normal or high GFR (GFR > 90 mL/min/1.73 square meters)    Stage 2 Mild CKD (GFR = 60-89 mL/min/1.73 square meters)    Stage 3A Moderate CKD (GFR = 45-59 mL/min/1.73 square meters)    Stage 3B Moderate CKD (GFR = 30-44 mL/min/1.73 square meters)    Stage 4 Severe CKD (GFR = 15-29 mL/min/1.73 square meters)    Stage 5 End Stage CKD (GFR <15 mL/min/1.73 square meters)  Note: GFR calculation is accurate only with a steady state creatinine    CBC and differential [[id number]]  (Abnormal) Collected: 07/16/25 1228    Lab Status: Final result Specimen: Blood from Arm, Right Updated: 07/16/25 1258     WBC 4.45 Thousand/uL      RBC 4.06 Million/uL      Hemoglobin 11.6 g/dL      Hematocrit 35.4 %      MCV 87 fL      MCH 28.6 pg      MCHC 32.8 g/dL      RDW 16.2 %      MPV 8.2 fL      Platelets 357 Thousands/uL      nRBC 0 /100 WBCs      Segmented % 66 %      Immature Grans % 0 %      Lymphocytes % 25 %      Monocytes % 7 %      Eosinophils Relative 0 %      Basophils Relative 2 %      Absolute Neutrophils 2.96 Thousands/µL      Absolute Immature Grans 0.01 Thousand/uL      Absolute Lymphocytes 1.09 Thousands/µL      Absolute Monocytes 0.31 Thousand/µL      Eosinophils Absolute 0.01 Thousand/µL      Basophils Absolute 0.07 Thousands/µL               ED Course         Critical Care Time  Procedures

## 2025-07-16 NOTE — ED CARE HANDOFF
[facility] Warm Handoff Outcome Note    Patient name Mckenna Brown  Location ED-22/ED-22 MRN [id number]  Age: 40 y.o.          Plan Type:  Warm Handoff                                                                                    Plan Date: 7/16/2025  Service:  ED Warm Handoff      Substance Use History:  ETOH    Warm Handoff Update:  Pt accepted OP resources from [facility]  Not interested in IP services    Warm Handoff Outcome: Outpatient

## 2025-07-16 NOTE — Clinical Note
Mckenna Brown was seen and treated in our emergency department on 7/16/2025.    No restrictions            Diagnosis:     Mckenna  may return to work on return date.    She may return on this date: 07/18/2025         If you have any questions or concerns, please don't hesitate to call.      Chin Orozco MD    ______________________________           _______________          _______________  Hospital Representative                              Date                                Time

## 2025-07-19 LAB
ATRIAL RATE: 128 BPM
P AXIS: 77 DEGREES
PR INTERVAL: 156 MS
QRS AXIS: 60 DEGREES
QRSD INTERVAL: 58 MS
QT INTERVAL: 282 MS
QTC INTERVAL: 411 MS
T WAVE AXIS: 27 DEGREES
VENTRICULAR RATE: 128 BPM

## 2025-07-19 PROCEDURE — 93010 ELECTROCARDIOGRAM REPORT: CPT | Performed by: INTERNAL MEDICINE
